# Patient Record
Sex: MALE | Race: WHITE | NOT HISPANIC OR LATINO | Employment: OTHER | URBAN - METROPOLITAN AREA
[De-identification: names, ages, dates, MRNs, and addresses within clinical notes are randomized per-mention and may not be internally consistent; named-entity substitution may affect disease eponyms.]

---

## 2024-02-22 ENCOUNTER — OFFICE VISIT (OUTPATIENT)
Dept: PRIMARY CARE | Facility: CLINIC | Age: 79
End: 2024-02-22
Payer: MEDICARE

## 2024-02-22 ENCOUNTER — LAB (OUTPATIENT)
Dept: LAB | Facility: LAB | Age: 79
End: 2024-02-22
Payer: MEDICARE

## 2024-02-22 VITALS
DIASTOLIC BLOOD PRESSURE: 76 MMHG | BODY MASS INDEX: 26.22 KG/M2 | OXYGEN SATURATION: 94 % | SYSTOLIC BLOOD PRESSURE: 152 MMHG | HEIGHT: 68 IN | WEIGHT: 173 LBS | HEART RATE: 70 BPM

## 2024-02-22 DIAGNOSIS — R03.0 ELEVATED BLOOD PRESSURE READING: Primary | ICD-10-CM

## 2024-02-22 DIAGNOSIS — Z12.5 PROSTATE CANCER SCREENING: ICD-10-CM

## 2024-02-22 DIAGNOSIS — R73.01 IMPAIRED FASTING GLUCOSE: Primary | ICD-10-CM

## 2024-02-22 DIAGNOSIS — E55.9 MILD VITAMIN D DEFICIENCY: ICD-10-CM

## 2024-02-22 DIAGNOSIS — R03.0 ELEVATED BLOOD PRESSURE READING: ICD-10-CM

## 2024-02-22 DIAGNOSIS — Z11.59 ENCOUNTER FOR HEPATITIS C SCREENING TEST FOR LOW RISK PATIENT: ICD-10-CM

## 2024-02-22 DIAGNOSIS — E78.5 DYSLIPIDEMIA: ICD-10-CM

## 2024-02-22 DIAGNOSIS — I10 PRIMARY HYPERTENSION: ICD-10-CM

## 2024-02-22 DIAGNOSIS — Z13.6 ENCOUNTER FOR ABDOMINAL AORTIC ANEURYSM (AAA) SCREENING: ICD-10-CM

## 2024-02-22 DIAGNOSIS — R97.20 ELEVATED PSA: ICD-10-CM

## 2024-02-22 LAB
25(OH)D3 SERPL-MCNC: 19 NG/ML (ref 30–100)
ALBUMIN SERPL BCP-MCNC: 4 G/DL (ref 3.4–5)
ALP SERPL-CCNC: 59 U/L (ref 33–136)
ALT SERPL W P-5'-P-CCNC: 25 U/L (ref 10–52)
ANION GAP SERPL CALC-SCNC: 13 MMOL/L (ref 10–20)
AST SERPL W P-5'-P-CCNC: 17 U/L (ref 9–39)
BILIRUB SERPL-MCNC: 0.5 MG/DL (ref 0–1.2)
BUN SERPL-MCNC: 17 MG/DL (ref 6–23)
CALCIUM SERPL-MCNC: 9.5 MG/DL (ref 8.6–10.6)
CHLORIDE SERPL-SCNC: 102 MMOL/L (ref 98–107)
CHOLEST SERPL-MCNC: 217 MG/DL (ref 0–199)
CHOLESTEROL/HDL RATIO: 3.7
CO2 SERPL-SCNC: 26 MMOL/L (ref 21–32)
CREAT SERPL-MCNC: 0.77 MG/DL (ref 0.5–1.3)
EGFRCR SERPLBLD CKD-EPI 2021: >90 ML/MIN/1.73M*2
ERYTHROCYTE [DISTWIDTH] IN BLOOD BY AUTOMATED COUNT: 13.6 % (ref 11.5–14.5)
EST. AVERAGE GLUCOSE BLD GHB EST-MCNC: 114 MG/DL
GLUCOSE SERPL-MCNC: 105 MG/DL (ref 74–99)
HBA1C MFR BLD: 5.6 %
HCT VFR BLD AUTO: 42.7 % (ref 41–52)
HCV AB SER QL: NONREACTIVE
HDLC SERPL-MCNC: 58.9 MG/DL
HGB BLD-MCNC: 14.6 G/DL (ref 13.5–17.5)
LDLC SERPL CALC-MCNC: 140 MG/DL
MCH RBC QN AUTO: 31.5 PG (ref 26–34)
MCHC RBC AUTO-ENTMCNC: 34.2 G/DL (ref 32–36)
MCV RBC AUTO: 92 FL (ref 80–100)
NON HDL CHOLESTEROL: 158 MG/DL (ref 0–149)
NRBC BLD-RTO: 0 /100 WBCS (ref 0–0)
PLATELET # BLD AUTO: 342 X10*3/UL (ref 150–450)
POTASSIUM SERPL-SCNC: 4.3 MMOL/L (ref 3.5–5.3)
PROT SERPL-MCNC: 6.4 G/DL (ref 6.4–8.2)
RBC # BLD AUTO: 4.64 X10*6/UL (ref 4.5–5.9)
SODIUM SERPL-SCNC: 137 MMOL/L (ref 136–145)
TRIGL SERPL-MCNC: 93 MG/DL (ref 0–149)
TSH SERPL-ACNC: 1.18 MIU/L (ref 0.44–3.98)
VLDL: 19 MG/DL (ref 0–40)
WBC # BLD AUTO: 8.1 X10*3/UL (ref 4.4–11.3)

## 2024-02-22 PROCEDURE — 83036 HEMOGLOBIN GLYCOSYLATED A1C: CPT

## 2024-02-22 PROCEDURE — 85027 COMPLETE CBC AUTOMATED: CPT

## 2024-02-22 PROCEDURE — 80053 COMPREHEN METABOLIC PANEL: CPT

## 2024-02-22 PROCEDURE — 3077F SYST BP >= 140 MM HG: CPT | Performed by: STUDENT IN AN ORGANIZED HEALTH CARE EDUCATION/TRAINING PROGRAM

## 2024-02-22 PROCEDURE — 86803 HEPATITIS C AB TEST: CPT

## 2024-02-22 PROCEDURE — 36415 COLL VENOUS BLD VENIPUNCTURE: CPT

## 2024-02-22 PROCEDURE — 84443 ASSAY THYROID STIM HORMONE: CPT

## 2024-02-22 PROCEDURE — 99215 OFFICE O/P EST HI 40 MIN: CPT | Performed by: STUDENT IN AN ORGANIZED HEALTH CARE EDUCATION/TRAINING PROGRAM

## 2024-02-22 PROCEDURE — 3078F DIAST BP <80 MM HG: CPT | Performed by: STUDENT IN AN ORGANIZED HEALTH CARE EDUCATION/TRAINING PROGRAM

## 2024-02-22 PROCEDURE — 82306 VITAMIN D 25 HYDROXY: CPT

## 2024-02-22 PROCEDURE — 1159F MED LIST DOCD IN RCRD: CPT | Performed by: STUDENT IN AN ORGANIZED HEALTH CARE EDUCATION/TRAINING PROGRAM

## 2024-02-22 PROCEDURE — 1125F AMNT PAIN NOTED PAIN PRSNT: CPT | Performed by: STUDENT IN AN ORGANIZED HEALTH CARE EDUCATION/TRAINING PROGRAM

## 2024-02-22 PROCEDURE — G0103 PSA SCREENING: HCPCS

## 2024-02-22 PROCEDURE — 84154 ASSAY OF PSA FREE: CPT

## 2024-02-22 PROCEDURE — 80061 LIPID PANEL: CPT

## 2024-02-22 RX ORDER — ACETAMINOPHEN 500 MG
1 TABLET ORAL DAILY
Qty: 1 KIT | Refills: 0 | Status: SHIPPED | OUTPATIENT
Start: 2024-02-22

## 2024-02-22 RX ORDER — IBUPROFEN 100 MG/5ML
SUSPENSION, ORAL (FINAL DOSE FORM) ORAL
COMMUNITY

## 2024-02-22 RX ORDER — ALBUTEROL SULFATE 90 UG/1
AEROSOL, METERED RESPIRATORY (INHALATION)
COMMUNITY
Start: 2023-12-04

## 2024-02-22 RX ORDER — LATANOPROST 50 UG/ML
1 SOLUTION/ DROPS OPHTHALMIC NIGHTLY
COMMUNITY
Start: 2024-02-09

## 2024-02-22 RX ORDER — GLUCOSAMINE HCL 500 MG
TABLET ORAL
COMMUNITY
Start: 2021-07-27

## 2024-02-22 NOTE — PATIENT INSTRUCTIONS
Labs in [address] today.     Updated Coronary Calcium Scoring ordered  Updated ultrasound of the aorta to assess for aneurysm  Call [phone] to schedule     Consider updated Colonoscopy   Alternatively, we could do another COLOGUARD     Home Blood Pressure monitoring   Once daily in the morning   Feet flat on the ground; before any morning coffee    Follow up with me in 2-3 months!    Dr. DONG Ferguson

## 2024-02-22 NOTE — PROGRESS NOTES
Silver Villalpando is a 78 y.o. male seen in Clinic at [facility] by Dr. Garrick Addison on 02/22/24 for routine care, as well as for management of the following chronic medical conditions: elevated BP (without diagnosis of HTN), Glaucoma (follows with optho), mild DLD (not on statin). Patient presents today to establish care.     #Elevated BP   - 152/76 manual in office; similar to initial automated   [  ] updated labs today   [  ] home monitoring  [  ] lifestyle discussion--overall very good lifestyle habits  [  ] close follow up in 2-3 months     #DLD  - last lipid panel 06/2022 with , , HDL 57  [  ] updated labs today  - prior CAC scoring in 2017 with result 154  [  ] updated CAC scoring  [  ] discussed at length statin, may consider; would like to wait on updated labs and CAC scoring      #Glaucoma   - follows with optho    Past Medical History: as above   Past Medical History:   Diagnosis Date    Acute maxillary sinusitis, unspecified 12/31/2014    Acute maxillary sinusitis    Acute prostatitis 02/11/2014    Acute bacterial prostatitis    Chronic ethmoidal sinusitis 03/11/2015    Sinusitis chronic, ethmoidal    Cough, unspecified 01/06/2014    Cough    Diverticulosis of large intestine without perforation or abscess without bleeding 01/23/2018    Diverticulosis of large intestine without hemorrhage    Encounter for screening for malignant neoplasm of colon 05/10/2022    Colon cancer screening    Hydrocele, unspecified 01/23/2018    Hydrocele, bilateral    Hyperglycemia, unspecified 05/02/2016    Hyperglycemia    Hyperlipidemia, unspecified 06/01/2022    Hyperlipidemia    Left lower quadrant pain 04/18/2014    Lt groin pain    Low back pain, unspecified 03/18/2015    Low back pain    Other cervical disc degeneration, unspecified cervical region     Degeneration of cervical intervertebral disc    Palpitations 03/18/2015    Palpitations    Personal history of other diseases of the musculoskeletal system and  connective tissue     History of arthritis    Personal history of other diseases of the nervous system and sense organs     History of glaucoma    Pure hypercholesterolemia, unspecified     High cholesterol    Snoring     Snoring    Unspecified glaucoma 2021    Glaucoma of both eyes    Vasomotor rhinitis 2016    Vasomotor rhinitis     Subspecialty Medical Care: orthopedics, urology, ENT, optho    Past Surgical History: Hydrocele   Past Surgical History:   Procedure Laterality Date    KNEE SURGERY  2014    Knee Surgery    OTHER SURGICAL HISTORY  2014    Treatment Of Wrist Fracture     Medications:  Current Outpatient Medications:     albuterol 90 mcg/actuation inhaler, INHALE 2 PUFFS BY MOUTH INTO THE LUNGS EVERY 4 HOURS AS NEEDED FO...  (REFER TO PRESCRIPTION NOTES)., Disp: , Rfl:     cholecalciferol, vitamin D3, 75 mcg (3,000 unit) tablet, Take by mouth., Disp: , Rfl:     latanoprost (Xalatan) 0.005 % ophthalmic solution, Administer 1 drop into both eyes once daily at bedtime., Disp: , Rfl:     ascorbic acid (Vitamin C) 1,000 mg tablet, Take by mouth., Disp: , Rfl:   Pharmacy: [facility] ([place])     Allergies:   - remote Amoxicillin--diarrhea, upset stomach; likely not true allergy   - Rosuvastatin--myalgias   Allergies   Allergen Reactions    Amoxicillin Nausea Only    Rosuvastatin Myalgia    Sulfites Hives    Tamsulosin Other     Immunizations:   - Pneumonia complete  - Tdap due 2024  - COVID vaccinated; recommend staying UTD on boosters  - Flu shot recommended annually   - Shingrix recommended  - RSV recommended     Family History:   - Father with CAD, father  at 75   - Mother with Angina but lived to   - Maternal Aunts all with some form of CAD   - Maternal Grandmother with liver cancer   No family history on file.    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   Activities: golf; no chest pain with exertion   Drug Use: remote smoker, quit in 1990s  Diet: well balanced diet   Depression/Anxiety: discuss at CPE   Sexuality/Contraception/Menstrual History:    Sleep: no concerns     Patient Information:  Health Insurance: Medicare   Transportation: drives   Healthcare POA/Guardian: wife  Contact Information: correct in EMR     Visit Vitals  /76   Pulse 70   Ht 1.727 m (5' 8\")   Wt 78.5 kg (173 lb)   SpO2 94%   BMI 26.30 kg/m²   BSA 1.94 m²      PHYSICAL EXAM:   General: well appearing  male, NAD, pleasant and engaged in encounter    HEENT: NCAT, MMM  CV: RRR, no m/r/g  PULM: CTAB, non-labored respirations   ABD: soft, NT, ND, + bowel sounds   : no suprapubic or CVA tenderness   EXT: WWP, no significant edema   SKIN: no rashes noted   NEURO: A&Ox4, symmetric facies, no gross motor or sensory deficits, normal gait  PSYCH: pleasant mood, appropriate affect     Assessment/Plan    Silver Villalpando is a 78 y.o. male seen in Clinic at [facility] by Dr. Garrick Addison on 02/22/24 for routine care, as well as for management of the following chronic medical conditions: elevated BP (without diagnosis of HTN), Glaucoma (follows with optho), mild DLD (not on statin). Patient presents today to establish care.     #Elevated BP   - 152/76 manual in office; similar to initial automated   [  ] updated labs today   [  ] home monitoring  [  ] lifestyle discussion--overall very good lifestyle habits  [  ] close follow up in 2-3 months     #DLD  - last lipid panel 06/2022 with , , HDL 57  [  ] updated labs today  - prior CAC scoring in 2017 with result 154  [  ] updated CAC scoring  [  ] discussed at length statin, may consider; would like to wait on updated labs and CAC scoring      #Glaucoma   - follows with optho    #Health Maintenance    Cancer Screening  - Colorectal Cancer Screening: considering (colo vs. Updated cologuard)   - Lung Cancer " Screening: not candidate  - Prostate Cancer Screening: labs today     Laboratory Screening  - Lipid Screen: labs today   - ASCVD Score: labs and updated CAC today   - A1C, glucose screen: labs today   - STI, HIV, Hep B screen: defer, low risk   - Hep C screen: negative 2024     Imaging Screening  - AAA screening: prior negative, though some ectasia commented upon; follow up imaging today   - Osteoporosis/DEXA screening: normal 2022     Immunizations:   - Influenza: annually recommended  - COVID: recommend staying UTD  - RSV: recommended   - Tdap: due 12/2024 (give at CPE/MCW visit)   - Prevnar, Pneumovax: series complete   - Shingrix: recommended     Other Screening  - 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     Patient Discussion:    Please call back the office with any questions at [phone]. In the case of an emergency, please call 911 or go to the nearest Emergency Department.      Garrick Addison MD  Internal Medicine-Pediatrics  [facility] [address], [address]  P: [phone], F: [phone]

## 2024-02-24 LAB
PSA FREE MFR SERPL: 15 %
PSA FREE SERPL-MCNC: 1.1 NG/ML
PSA SERPL IA-MCNC: 7.1 NG/ML (ref 0–4)

## 2024-02-28 DIAGNOSIS — M65.322 TRIGGER INDEX FINGER OF LEFT HAND: ICD-10-CM

## 2024-03-13 ENCOUNTER — HOSPITAL ENCOUNTER (OUTPATIENT)
Dept: VASCULAR MEDICINE | Facility: CLINIC | Age: 79
Discharge: HOME | End: 2024-03-13
Payer: MEDICARE

## 2024-03-13 DIAGNOSIS — Z13.6 ENCOUNTER FOR ABDOMINAL AORTIC ANEURYSM (AAA) SCREENING: ICD-10-CM

## 2024-03-13 PROCEDURE — 76706 US ABDL AORTA SCREEN AAA: CPT

## 2024-03-14 ENCOUNTER — HOSPITAL ENCOUNTER (OUTPATIENT)
Dept: RADIOLOGY | Facility: HOSPITAL | Age: 79
Discharge: HOME | End: 2024-03-14
Payer: MEDICARE

## 2024-03-14 DIAGNOSIS — E78.5 DYSLIPIDEMIA: ICD-10-CM

## 2024-03-14 PROCEDURE — 75571 CT HRT W/O DYE W/CA TEST: CPT

## 2024-03-15 ENCOUNTER — HOSPITAL ENCOUNTER (OUTPATIENT)
Facility: CLINIC | Age: 79
Setting detail: OUTPATIENT SURGERY
Discharge: HOME | End: 2024-03-15
Attending: ORTHOPAEDIC SURGERY | Admitting: ORTHOPAEDIC SURGERY
Payer: MEDICARE

## 2024-03-15 VITALS
WEIGHT: 173.06 LBS | SYSTOLIC BLOOD PRESSURE: 156 MMHG | HEART RATE: 68 BPM | TEMPERATURE: 98.6 F | OXYGEN SATURATION: 94 % | RESPIRATION RATE: 16 BRPM | DIASTOLIC BLOOD PRESSURE: 79 MMHG | HEIGHT: 68 IN | BODY MASS INDEX: 26.23 KG/M2

## 2024-03-15 DIAGNOSIS — M65.322 TRIGGER INDEX FINGER OF LEFT HAND: Primary | ICD-10-CM

## 2024-03-15 PROCEDURE — 2500000004 HC RX 250 GENERAL PHARMACY W/ HCPCS (ALT 636 FOR OP/ED): Performed by: ORTHOPAEDIC SURGERY

## 2024-03-15 PROCEDURE — 26055 INCISE FINGER TENDON SHEATH: CPT | Performed by: ORTHOPAEDIC SURGERY

## 2024-03-15 PROCEDURE — 3600000008 HC OR TIME - EACH INCREMENTAL 1 MINUTE - PROCEDURE LEVEL THREE: Performed by: ORTHOPAEDIC SURGERY

## 2024-03-15 PROCEDURE — 2500000005 HC RX 250 GENERAL PHARMACY W/O HCPCS: Performed by: ORTHOPAEDIC SURGERY

## 2024-03-15 PROCEDURE — 3600000003 HC OR TIME - INITIAL BASE CHARGE - PROCEDURE LEVEL THREE: Performed by: ORTHOPAEDIC SURGERY

## 2024-03-15 PROCEDURE — 7100000010 HC PHASE TWO TIME - EACH INCREMENTAL 1 MINUTE: Performed by: ORTHOPAEDIC SURGERY

## 2024-03-15 PROCEDURE — 7100000009 HC PHASE TWO TIME - INITIAL BASE CHARGE: Performed by: ORTHOPAEDIC SURGERY

## 2024-03-15 PROCEDURE — A4217 STERILE WATER/SALINE, 500 ML: HCPCS | Performed by: ORTHOPAEDIC SURGERY

## 2024-03-15 RX ORDER — DIPHENHYDRAMINE HYDROCHLORIDE 50 MG/ML
12.5 INJECTION INTRAMUSCULAR; INTRAVENOUS ONCE AS NEEDED
Status: DISCONTINUED | OUTPATIENT
Start: 2024-03-15 | End: 2024-03-15 | Stop reason: HOSPADM

## 2024-03-15 RX ORDER — FENTANYL CITRATE 50 UG/ML
25 INJECTION, SOLUTION INTRAMUSCULAR; INTRAVENOUS EVERY 5 MIN PRN
Status: DISCONTINUED | OUTPATIENT
Start: 2024-03-15 | End: 2024-03-15 | Stop reason: HOSPADM

## 2024-03-15 RX ORDER — ALBUTEROL SULFATE 0.83 MG/ML
2.5 SOLUTION RESPIRATORY (INHALATION) ONCE AS NEEDED
Status: DISCONTINUED | OUTPATIENT
Start: 2024-03-15 | End: 2024-03-15 | Stop reason: HOSPADM

## 2024-03-15 RX ORDER — SODIUM CHLORIDE, SODIUM LACTATE, POTASSIUM CHLORIDE, CALCIUM CHLORIDE 600; 310; 30; 20 MG/100ML; MG/100ML; MG/100ML; MG/100ML
100 INJECTION, SOLUTION INTRAVENOUS CONTINUOUS
Status: DISCONTINUED | OUTPATIENT
Start: 2024-03-15 | End: 2024-03-15 | Stop reason: HOSPADM

## 2024-03-15 RX ORDER — ONDANSETRON HYDROCHLORIDE 2 MG/ML
4 INJECTION, SOLUTION INTRAVENOUS ONCE AS NEEDED
Status: DISCONTINUED | OUTPATIENT
Start: 2024-03-15 | End: 2024-03-15 | Stop reason: HOSPADM

## 2024-03-15 RX ORDER — LABETALOL HYDROCHLORIDE 5 MG/ML
5 INJECTION, SOLUTION INTRAVENOUS ONCE AS NEEDED
Status: DISCONTINUED | OUTPATIENT
Start: 2024-03-15 | End: 2024-03-15 | Stop reason: HOSPADM

## 2024-03-15 RX ORDER — HYDROCODONE BITARTRATE AND ACETAMINOPHEN 5; 325 MG/1; MG/1
1 TABLET ORAL EVERY 6 HOURS PRN
Qty: 12 TABLET | Refills: 0 | Status: SHIPPED | OUTPATIENT
Start: 2024-03-15 | End: 2024-05-08 | Stop reason: ALTCHOICE

## 2024-03-15 RX ORDER — OXYCODONE HYDROCHLORIDE 10 MG/1
10 TABLET ORAL EVERY 4 HOURS PRN
Status: DISCONTINUED | OUTPATIENT
Start: 2024-03-15 | End: 2024-03-15 | Stop reason: HOSPADM

## 2024-03-15 RX ORDER — LIDOCAINE IN NACL,ISO-OSMOT/PF 30 MG/3 ML
0.1 SYRINGE (ML) INJECTION ONCE
Status: DISCONTINUED | OUTPATIENT
Start: 2024-03-15 | End: 2024-03-15 | Stop reason: HOSPADM

## 2024-03-15 RX ORDER — OXYCODONE HYDROCHLORIDE 5 MG/1
5 TABLET ORAL EVERY 4 HOURS PRN
Status: DISCONTINUED | OUTPATIENT
Start: 2024-03-15 | End: 2024-03-15 | Stop reason: HOSPADM

## 2024-03-15 RX ORDER — HYDRALAZINE HYDROCHLORIDE 20 MG/ML
5 INJECTION INTRAMUSCULAR; INTRAVENOUS EVERY 30 MIN PRN
Status: DISCONTINUED | OUTPATIENT
Start: 2024-03-15 | End: 2024-03-15 | Stop reason: HOSPADM

## 2024-03-15 RX ORDER — DROPERIDOL 2.5 MG/ML
0.62 INJECTION, SOLUTION INTRAMUSCULAR; INTRAVENOUS ONCE AS NEEDED
Status: DISCONTINUED | OUTPATIENT
Start: 2024-03-15 | End: 2024-03-15 | Stop reason: HOSPADM

## 2024-03-15 RX ORDER — SODIUM CHLORIDE 0.9 G/100ML
IRRIGANT IRRIGATION AS NEEDED
Status: DISCONTINUED | OUTPATIENT
Start: 2024-03-15 | End: 2024-03-15 | Stop reason: HOSPADM

## 2024-03-15 RX ORDER — FENTANYL CITRATE 50 UG/ML
50 INJECTION, SOLUTION INTRAMUSCULAR; INTRAVENOUS EVERY 5 MIN PRN
Status: DISCONTINUED | OUTPATIENT
Start: 2024-03-15 | End: 2024-03-15 | Stop reason: HOSPADM

## 2024-03-15 ASSESSMENT — COLUMBIA-SUICIDE SEVERITY RATING SCALE - C-SSRS
6. HAVE YOU EVER DONE ANYTHING, STARTED TO DO ANYTHING, OR PREPARED TO DO ANYTHING TO END YOUR LIFE?: NO
2. HAVE YOU ACTUALLY HAD ANY THOUGHTS OF KILLING YOURSELF?: NO
1. IN THE PAST MONTH, HAVE YOU WISHED YOU WERE DEAD OR WISHED YOU COULD GO TO SLEEP AND NOT WAKE UP?: NO

## 2024-03-15 ASSESSMENT — PAIN - FUNCTIONAL ASSESSMENT
PAIN_FUNCTIONAL_ASSESSMENT: 0-10
PAIN_FUNCTIONAL_ASSESSMENT: 0-10

## 2024-03-15 ASSESSMENT — PAIN SCALES - GENERAL
PAINLEVEL_OUTOF10: 0 - NO PAIN
PAINLEVEL_OUTOF10: 0 - NO PAIN

## 2024-03-15 NOTE — H&P
[facility] Department of Orthopaedic Surgery   Surgical History & Physical >30 Days    Reason for Surgery: left index finger trigger finger  Planned Procedure: left index finger trigger finger release    History & Physical Reviewed:  I have reviewed the History and Physical dated 9/26/23. Relevant findings and updates are noted below:  No significant changes.     Past Medical History:   Diagnosis Date    Acute maxillary sinusitis, unspecified 12/31/2014    Acute maxillary sinusitis    Acute prostatitis 02/11/2014    Acute bacterial prostatitis    Chronic ethmoidal sinusitis 03/11/2015    Sinusitis chronic, ethmoidal    Cough, unspecified 01/06/2014    Cough    Diverticulosis of large intestine without perforation or abscess without bleeding 01/23/2018    Diverticulosis of large intestine without hemorrhage    Encounter for screening for malignant neoplasm of colon 05/10/2022    Colon cancer screening    Hydrocele, unspecified 01/23/2018    Hydrocele, bilateral    Hyperglycemia, unspecified 05/02/2016    Hyperglycemia    Hyperlipidemia, unspecified 06/01/2022    Hyperlipidemia    Left lower quadrant pain 04/18/2014    Lt groin pain    Low back pain, unspecified 03/18/2015    Low back pain    Other cervical disc degeneration, unspecified cervical region     Degeneration of cervical intervertebral disc    Palpitations 03/18/2015    Palpitations    Personal history of other diseases of the musculoskeletal system and connective tissue     History of arthritis    Personal history of other diseases of the nervous system and sense organs     History of glaucoma    Pure hypercholesterolemia, unspecified     High cholesterol    Snoring     Snoring    Unspecified glaucoma 07/26/2021    Glaucoma of both eyes    Vasomotor rhinitis 05/02/2016    Vasomotor rhinitis     Past Surgical History:   Procedure Laterality Date    KNEE SURGERY  12/31/2014    Knee Surgery    OTHER SURGICAL HISTORY  12/31/2014    Treatment Of Wrist  Fracture     Social History     Tobacco Use    Smoking status: Not on file    Smokeless tobacco: Not on file   Substance Use Topics    Alcohol use: Not on file     Prior to Admission medications    Medication Sig Start Date End Date Taking? Authorizing Provider   albuterol 90 mcg/actuation inhaler INHALE 2 PUFFS BY MOUTH INTO THE LUNGS EVERY 4 HOURS AS NEEDED FO...  (REFER TO PRESCRIPTION NOTES). 12/4/23   Historical Provider, MD   ascorbic acid (Vitamin C) 1,000 mg tablet Take by mouth.    Historical Provider, MD   blood pressure monitor (Blood Pressure Kit) kit 1 Units once daily. 2/22/24   Garrick Addison MD   cholecalciferol, vitamin D3, 75 mcg (3,000 unit) tablet Take by mouth. 7/27/21   Historical Provider, MD   latanoprost (Xalatan) 0.005 % ophthalmic solution Administer 1 drop into both eyes once daily at bedtime. 2/9/24   Historical Provider, MD     Allergies   Allergen Reactions    Amoxicillin Nausea Only    Rosuvastatin Myalgia    Sulfites Hives    Tamsulosin Other       Review of Systems:   Gen: Denies recent weight loss  Neuro: Denies recent confusion  Ophtho: Denies changes in vision  ENT: Denies changes in hearing  Endo: Denies weight loss/weight gain  CV: Denies chest pain  Resp: Denies shortness of breath  GI: Denies melena/hematochezia  : Denies painful urination  MSK: Per above HPI  Heme: No abnormal bleeding  Psych: Denies hallucinations      ERAS patient?: No    COVID-19 Risk Consent:   Surgeon has reviewed the key risks related to debbie COVID-19 and subsequent sequelae.       Martin Cooper MD 03/15/24

## 2024-03-15 NOTE — OP NOTE
Left index finger trigger finger release / 25 Minutes (L) Operative Note     Date: 3/15/2024  OR Location: [facility] OR    Name: Silver Villalpando, : 1945, Age: 79 y.o., MRN: [id number], Sex: male    Diagnosis  Pre-op Diagnosis     * Trigger index finger of left hand [M65.322] Post-op Diagnosis     * Trigger index finger of left hand [M65.322]     Procedures  Left index finger trigger finger release / 25 Minutes  22124 - TN TENDON SHEATH INCISION      Surgeons      * Jose Vo - Primary    Resident/Fellow/Other Assistant:  Surgeon(s) and Role:     * Martin Cooper MD - Resident - Assisting    Procedure Summary  Anesthesia: Local  ASA: ASA status not filed in the log.  Anesthesia Staff: No anesthesia staff entered.  Estimated Blood Loss: 0 mL  Intra-op Medications: Administrations occurring from 1030 to 1110 on 03/15/24:  * No intraprocedure medications in log *           Anesthesia Record               Intraprocedure I/O Totals       None           Specimen: No specimens collected     Staff:   Circulator: Erin Buchanan RN  Scrub Person: Mile Butler RN         Drains and/or Catheters: * None in log *    Tourniquet Times:     Total Tourniquet Time Documented:  Arm - Upper (Left) - 6 minutes  Total: Arm - Upper (Left) - 6 minutes      Implants:     Findings: Left index finger trigger finger    Indications: Silver Villalpando is an 79 y.o. male who is having surgery for Trigger index finger of left hand [M65.322].      The patient was seen in the preoperative area. The risks, benefits, complications, treatment options, non-operative alternatives, expected recovery and outcomes were discussed with the patient. The possibilities of reaction to medication, pulmonary aspiration, injury to surrounding structures, bleeding, recurrent infection, the need for additional procedures, failure to diagnose a condition, and creating a complication requiring transfusion or operation were discussed with the patient. The  patient concurred with the proposed plan, giving informed consent.  The site of surgery was properly noted/marked if necessary per policy. The patient has been actively warmed in preoperative area. Preoperative antibiotics are not indicated. Venous thrombosis prophylaxis are not indicated.    Procedure Details:   79-year-old gentleman with symptomatic triggering left index finger presents today for trigger finger release.  Preoperatively left hand was identified and marked for surgery.  Informed consent process was completed.    Patient was brought to the operating room placed supine on the operating table.  Timeout procedure formed to verify correct patient procedure and operative site.  Local anesthetic infiltrated in the palm in line with the index finger.  Left upper extremity was prepped and draped in usual sterile fashion.  Limb was exsanguinated and a tourniquet was inflated to 250 mmHg.    We made an oblique incision over the A1 pulley of the index finger flexor and sheath.  Blunt dissection was carried down to expose the tendon sheath.  Retractors were placed to protect the neurovascular bundles.  The A1 pulley was divided longitudinally.  The patient is able to demonstrate full active finger flexion with no further triggering.  Wound was irrigated and closed after fashion.  A sterile bandage was applied and the tourniquet was deflated.  The patient was transferred to recovery in stable condition.    Postoperatively he will be discharged home once comfortable.  He can remove his bandage on postop day #4 and begin wound care with gentle activities as instructed.  Return to clinic in 2 weeks for wound VAC and advancement of his activities.         Complications:  None; patient tolerated the procedure well.    Disposition: PACU - hemodynamically stable.  Condition: stable         Additional Details:        Attending Attestation: I was present and scrubbed for the entire procedure.    Jose Vo  Phone  Number: [phone]

## 2024-03-15 NOTE — RESULT ENCOUNTER NOTE
HE WOULD RATHER WAIT TO DISCUSS AT FOLLOW UP VISIT AND SAYS HE HAS AN MRI COMING UP HE WOULD WANT TO DISCUSS THAT  WELL.

## 2024-03-21 ENCOUNTER — HOSPITAL ENCOUNTER (OUTPATIENT)
Dept: RADIOLOGY | Facility: HOSPITAL | Age: 79
Discharge: HOME | End: 2024-03-21
Payer: MEDICARE

## 2024-03-21 ENCOUNTER — HOSPITAL ENCOUNTER (OUTPATIENT)
Dept: RADIOLOGY | Facility: HOSPITAL | Age: 79
End: 2024-03-21
Payer: MEDICARE

## 2024-03-21 DIAGNOSIS — R97.20 ELEVATED PSA: ICD-10-CM

## 2024-03-21 PROCEDURE — 72197 MRI PELVIS W/O & W/DYE: CPT

## 2024-03-21 PROCEDURE — 72195 MRI PELVIS W/O DYE: CPT | Performed by: RADIOLOGY

## 2024-03-26 ENCOUNTER — TELEPHONE (OUTPATIENT)
Dept: PRIMARY CARE | Facility: CLINIC | Age: 79
End: 2024-03-26

## 2024-03-28 ENCOUNTER — OFFICE VISIT (OUTPATIENT)
Dept: ORTHOPEDIC SURGERY | Facility: CLINIC | Age: 79
End: 2024-03-28
Payer: MEDICARE

## 2024-03-28 VITALS — WEIGHT: 173 LBS | HEIGHT: 68 IN | BODY MASS INDEX: 26.22 KG/M2

## 2024-03-28 DIAGNOSIS — M65.322 TRIGGER INDEX FINGER OF LEFT HAND: Primary | ICD-10-CM

## 2024-03-28 PROCEDURE — 99024 POSTOP FOLLOW-UP VISIT: CPT | Performed by: ORTHOPAEDIC SURGERY

## 2024-03-28 PROCEDURE — 1036F TOBACCO NON-USER: CPT | Performed by: ORTHOPAEDIC SURGERY

## 2024-03-28 PROCEDURE — 1159F MED LIST DOCD IN RCRD: CPT | Performed by: ORTHOPAEDIC SURGERY

## 2024-03-28 ASSESSMENT — PAIN - FUNCTIONAL ASSESSMENT: PAIN_FUNCTIONAL_ASSESSMENT: 0-10

## 2024-03-28 ASSESSMENT — PAIN SCALES - GENERAL: PAINLEVEL_OUTOF10: 5 - MODERATE PAIN

## 2024-03-28 ASSESSMENT — PAIN DESCRIPTION - DESCRIPTORS: DESCRIPTORS: SORE;TIGHTNESS

## 2024-03-28 NOTE — PROGRESS NOTES
First postoperative visit after left index finger trigger finger release performed on March 15.  Overall doing very well.  Has some subjective stiffness with attempted flexion of the finger.  This is worse in the morning and gets better throughout the day.    Examination reveals healed surgical incision.  Mild distal palmar swelling.  Full finger extension.  He lacks terminal composite active index finger flexion.  Passively I am able to bring him into a full fist but this is subjectively tight.  No evidence of any triggering.  Normal sensation.    Impression: Left index finger trigger finger.    Plan: Patient has requested a referral to therapy.  He did therapy after trigger finger release to his right hand many years ago by another surgeon and thought it was very beneficial.  Referral provided.  Follow-up with me as needed for any further concerns.    Jose Vo MD    [facility] [employer]  Department of Orthopaedic Surgery  Chief of Hand and Upper Extremity Surgery  [facility]    Dictation performed with the use of voice recognition software. Syntax and grammatical errors may exist.

## 2024-04-01 ENCOUNTER — EVALUATION (OUTPATIENT)
Dept: OCCUPATIONAL THERAPY | Facility: HOSPITAL | Age: 79
End: 2024-04-01
Payer: MEDICARE

## 2024-04-01 DIAGNOSIS — M65.322 TRIGGER INDEX FINGER OF LEFT HAND: Primary | ICD-10-CM

## 2024-04-01 PROCEDURE — 97110 THERAPEUTIC EXERCISES: CPT | Mod: GO | Performed by: OCCUPATIONAL THERAPIST

## 2024-04-01 PROCEDURE — 97165 OT EVAL LOW COMPLEX 30 MIN: CPT | Mod: GO | Performed by: OCCUPATIONAL THERAPIST

## 2024-04-01 NOTE — PROGRESS NOTES
Occupational Therapy  Occupational Therapy Orthopedic Evaluation    Patient Name: Silver Villalpando  MRN: [id number]  Today's Date: 4/10/2024  Time Calculation  Start Time: 0735  Stop Time: 0810  Time Calculation (min): 35 min    Insurance:  Visit number: 1   Time:      Insurance Type: Payor: [employer] / Plan: [employer] MEDICARE ADVANTAGE / Product Type: *No Product type* /       General:  Reason for visit: L index finger  Referred by: Dr. Vo    Current Problem  1. Trigger index finger of left hand  Follow Up In Occupational Therapy    Referral to Occupational Therapy          Precautions: per post op protocol       Medical History Form: Reviewed (scanned into chart)    Subjective:   Chief Complaint: L index finger  Onset: ongoing  DOI/DOS: 03/15/2024  L IF trigger release      Hand Dominance: Right    Current Condition since injury:   same     PAIN     Location: 1/10 L IF  Aggravating Factors: functional use  Relieving Factors: rest    Relevant Information (PMH & Previous Tests/Imaging): Reviewed in chart    Prior Level of Function (PLOF)  Exercise/Physical Activity: golfing  Work/School: retired  Current ADL/IADL Status: homemaking     Patients Living Environment: Reviewed and no concern    Primary Language: English    Pt goals for therapy: improve functional use of R hand for golfing, cooking, cleaning, opening jars, squeezing bottles, cleaning, grasping objects, and various household chores    Red Flags: Do you have any of the following? No  Fever/chills, unexplained weight changes, dizziness/fainting, unexplained change in bowel or bladder functions, unexplained malaise or muscle weakness, night pain/sweats, numbness or tingling    Objective:  Distance to DPC  IF: 2 cm  Extension/flexion  MCP: 20-75  PIP: 15-85  DIP: 0-65    Physical Observation: minimally raised scar  Edema: moderate edema    Sensory: altered sensation/circulation  to all tips of digits - ongoing prior to surgery    Outcome Measures:  Quick  DASH: 22.72    EDUCATION: home exercise program, plan of care, activity modifications, pain management, and injury pathology       Goals:  Active       OT Goals       OT Goal 1       Start:  04/01/24    Expected End:  04/22/24       Improve digit flexion to composite fist for hold objects in hand.         OT Goal 2       Start:  04/01/24    Expected End:  04/22/24       Improve PIP and MCP extension to 5 degrees for reaching to grasp objects in hand.         OT Goal 3       Start:  04/01/24    Expected End:  05/13/24       Improve Quick DASH to 15%.         OT Goal 4       Start:  04/01/24    Expected End:  05/13/24       Report compliance with home program for improved fucntional use of L hand.         OT Goal 5       Start:  04/01/24    Expected End:  05/13/24       Improve  strength to 85% of unaffected side for holding golf club and grasping heavier household objects.             Plan of care was developed with input and agreement by the patient    Treatments:     Low complexity evaluation  35 min       Therapeutic Exercise:      HEP education and completion: hook fist, straight fist, composite fist, intrinsic plus, active digit extension, intrinsic stretch, composite flexion stretch     Manual Therapy:        Therapist performed manual scar mobilization and IASTM.       Assessment: Patient is a 80 yo male  s/p L index finger trigger release resulting in limited participation in pain-free ADLs and inability to perform at their prior level of function. Pt would benefit from occupational therapy to address the impairments found & listed previously in the objective section in order to return to safe and pain-free ADLs and prior level of function.       Plan:      Planned Interventions include: therapeutic exercise, therapeutic activity, self-care home management, manual therapy, therapeutic activities, gait training, neuromuscular coordination, vasopneumatic, dry needling, aquatic therapy, electric stimulation,  fluidotherapy, ultrasound, kinesiotaping, orthosis fabrication, wound care  Frequency: 1 x Week  Duration: 6 Weeks      Geoffrey Bundy, OT

## 2024-04-02 ENCOUNTER — OFFICE VISIT (OUTPATIENT)
Dept: UROLOGY | Facility: CLINIC | Age: 79
End: 2024-04-02
Payer: MEDICARE

## 2024-04-02 VITALS — BODY MASS INDEX: 25.76 KG/M2 | HEIGHT: 68 IN | TEMPERATURE: 97.5 F | WEIGHT: 170 LBS

## 2024-04-02 DIAGNOSIS — R97.20 ELEVATED PSA: Primary | ICD-10-CM

## 2024-04-02 LAB
POC APPEARANCE, URINE: CLEAR
POC BILIRUBIN, URINE: NEGATIVE
POC BLOOD, URINE: NEGATIVE
POC COLOR, URINE: YELLOW
POC GLUCOSE, URINE: NEGATIVE MG/DL
POC KETONES, URINE: NEGATIVE MG/DL
POC LEUKOCYTES, URINE: NEGATIVE
POC NITRITE,URINE: NEGATIVE
POC PH, URINE: 6.5 PH
POC PROTEIN, URINE: NEGATIVE MG/DL
POC SPECIFIC GRAVITY, URINE: 1.02
POC UROBILINOGEN, URINE: 0.2 EU/DL

## 2024-04-02 PROCEDURE — 99203 OFFICE O/P NEW LOW 30 MIN: CPT | Performed by: UROLOGY

## 2024-04-02 PROCEDURE — 51798 US URINE CAPACITY MEASURE: CPT | Performed by: UROLOGY

## 2024-04-02 PROCEDURE — 1159F MED LIST DOCD IN RCRD: CPT | Performed by: UROLOGY

## 2024-04-02 PROCEDURE — 1036F TOBACCO NON-USER: CPT | Performed by: UROLOGY

## 2024-04-02 PROCEDURE — 81002 URINALYSIS NONAUTO W/O SCOPE: CPT | Performed by: UROLOGY

## 2024-04-02 RX ORDER — ASPIRIN 81 MG/1
81 TABLET ORAL DAILY
COMMUNITY

## 2024-04-02 NOTE — PROGRESS NOTES
Subjective   Silver Villalpando is a 79 y.o. male presenting today as a new patient, kindly referred by Dr. Addison due to elevated PSA. Patient has history of hydrocelectomy on 3/15/22 with Dr. Busch at [facility]. He reports all bothersome urinary symptoms resolved s/p hydrocelectomy. He has nocturia x2, which is not bothersome. PSA is 7.1 (2/22/24). Prostate MRI from 3/23/2024 showed 68.4g prostate with no sign of cancer.  Patient has no family history of prostate cancer. Denies any recent gross hematuria, fevers, chills, urinary retention, intractable flank or abdominal pain, nausea or vomiting.            Past Medical History:   Diagnosis Date    Acute maxillary sinusitis, unspecified 12/31/2014    Acute maxillary sinusitis    Acute prostatitis 02/11/2014    Acute bacterial prostatitis    Chronic ethmoidal sinusitis 03/11/2015    Sinusitis chronic, ethmoidal    Cough, unspecified 01/06/2014    Cough    Diverticulosis of large intestine without perforation or abscess without bleeding 01/23/2018    Diverticulosis of large intestine without hemorrhage    Encounter for screening for malignant neoplasm of colon 05/10/2022    Colon cancer screening    Hydrocele, unspecified 01/23/2018    Hydrocele, bilateral    Hyperglycemia, unspecified 05/02/2016    Hyperglycemia    Hyperlipidemia, unspecified 06/01/2022    Hyperlipidemia    Left lower quadrant pain 04/18/2014    Lt groin pain    Low back pain, unspecified 03/18/2015    Low back pain    Other cervical disc degeneration, unspecified cervical region     Degeneration of cervical intervertebral disc    Palpitations 03/18/2015    Palpitations    Personal history of other diseases of the musculoskeletal system and connective tissue     History of arthritis    Personal history of other diseases of the nervous system and sense organs     History of glaucoma    Pure hypercholesterolemia, unspecified     High cholesterol    Snoring     Snoring    Unspecified glaucoma  07/26/2021    Glaucoma of both eyes    Vasomotor rhinitis 05/02/2016    Vasomotor rhinitis     Past Surgical History:   Procedure Laterality Date    KNEE SURGERY  12/31/2014    Knee Surgery    OTHER SURGICAL HISTORY  12/31/2014    Treatment Of Wrist Fracture    TRIGGER FINGER RELEASE Right     4th finger     No family history on file.  Current Outpatient Medications   Medication Sig Dispense Refill    albuterol 90 mcg/actuation inhaler INHALE 2 PUFFS BY MOUTH INTO THE LUNGS EVERY 4 HOURS AS NEEDED FO...  (REFER TO PRESCRIPTION NOTES).      ascorbic acid (Vitamin C) 1,000 mg tablet Take by mouth.      aspirin 81 mg EC tablet Take 1 tablet (81 mg) by mouth once daily.      blood pressure monitor (Blood Pressure Kit) kit 1 Units once daily. 1 kit 0    cholecalciferol, vitamin D3, 75 mcg (3,000 unit) tablet Take by mouth.      HYDROcodone-acetaminophen (Norco) 5-325 mg tablet Take 1 tablet by mouth every 6 hours if needed for severe pain (7 - 10). (Patient not taking: Reported on 4/2/2024) 12 tablet 0    latanoprost (Xalatan) 0.005 % ophthalmic solution Administer 1 drop into both eyes once daily at bedtime.       No current facility-administered medications for this visit.     Allergies   Allergen Reactions    Amoxicillin Nausea Only    Rosuvastatin Myalgia    Sulfites Hives    Tamsulosin Other     Social History     Socioeconomic History    Marital status:      Spouse name: Not on file    Number of children: Not on file    Years of education: Not on file    Highest education level: Not on file   Occupational History    Not on file   Tobacco Use    Smoking status: Former     Types: Cigarettes    Smokeless tobacco: Never   Vaping Use    Vaping Use: Never used   Substance and Sexual Activity    Alcohol use: Yes     Comment: 4 drinks bourbon/week    Drug use: Never    Sexual activity: Defer   Other Topics Concern    Not on file   Social History Narrative    Not on file     Social Determinants of Health      Financial Resource Strain: Not on file   Food Insecurity: Not on file   Transportation Needs: Not on file   Physical Activity: Not on file   Stress: Not on file   Social Connections: Not on file   Intimate Partner Violence: Not on file   Housing Stability: Not on file       Review of Systems  Pertinent items are noted in HPI.    Objective     Lab Review  Lab Results   Component Value Date    WBC 8.1 02/22/2024    RBC 4.64 02/22/2024    HGB 14.6 02/22/2024    HCT 42.7 02/22/2024     02/22/2024      Lab Results   Component Value Date    BUN 17 02/22/2024    CREATININE 0.77 02/22/2024      Lab Results   Component Value Date    PSA 7.1 (H) 02/22/2024     PVR 11 mL  IPSS 3 and 0.   Urine analysis shows negative     Assessment/Plan   Diagnoses and all orders for this visit:  Elevated PSA  -     Measure post void residual  -     POCT UA (nonautomated) manually resulted  -     PSA; Future    Elevated PSA     PSA is 7.1 (2/22/24).    I reviewed prostate MRI from 3/23/2024 which showed 68.4g prostate with no sign of cancer.     We will repeat PSA in 1 year and follow up virtually.     All questions were answered to the patient's satisfaction. Patient agrees with the plan and wishes to proceed. Follow-up will be scheduled appropriately.     E&M visit today is associated with current or anticipated ongoing medical care services related to a patient's single, serious condition or a complex condition.    Scribed for Dr. Worthy by Vicki Nava. I , Dr Worthy, have personally reviewed and agreed with the information entered by the Virtual Scribe.

## 2024-04-10 ENCOUNTER — TREATMENT (OUTPATIENT)
Dept: OCCUPATIONAL THERAPY | Facility: HOSPITAL | Age: 79
End: 2024-04-10
Payer: MEDICARE

## 2024-04-10 DIAGNOSIS — M65.322 TRIGGER INDEX FINGER OF LEFT HAND: ICD-10-CM

## 2024-04-10 PROCEDURE — 97140 MANUAL THERAPY 1/> REGIONS: CPT | Mod: GO | Performed by: OCCUPATIONAL THERAPIST

## 2024-04-10 PROCEDURE — 97110 THERAPEUTIC EXERCISES: CPT | Mod: GO | Performed by: OCCUPATIONAL THERAPIST

## 2024-04-10 PROCEDURE — 97035 APP MDLTY 1+ULTRASOUND EA 15: CPT | Mod: GO | Performed by: OCCUPATIONAL THERAPIST

## 2024-04-10 NOTE — PROGRESS NOTES
Occupational Therapy  Occupational Therapy Orthopedic Evaluation    Patient Name: Silver Villalpando  MRN: [id number]  Today's Date: 4/10/2024       Insurance:  Visit number: 2   Time:   Time:  Time Calculation  Start Time: 1330  Stop Time: 1408  Time Calculation (min): 38 min  OT Modalities Time Entry  Ultrasound Time Entry: 8  OT Therapeutic Procedures Time Entry  Manual Therapy Time Entry: 15  Therapeutic Exercise Time Entry: 15       Insurance Type: Payor: [facility] MEDICARE / Plan: [facility] MEDICARE ADVANTAGE / Product Type: *No Product type* /       General:  Reason for visit: L index finger  Referred by: Dr. Vo    Current Problem  1. Trigger index finger of left hand  Follow Up In Occupational Therapy          Precautions: per post op protocol       Medical History Form: Reviewed (scanned into chart)    Subjective:   Chief Complaint: L index finger  Onset: ongoing  DOI/DOS: 03/15/2024  L IF trigger release      Hand Dominance: Right    Current Condition since injury:   same     PAIN     Location: 1/10 L IF  Aggravating Factors: functional use  Relieving Factors: rest    Relevant Information (PMH & Previous Tests/Imaging): Reviewed in chart    Prior Level of Function (PLOF)  Exercise/Physical Activity: golfing  Work/School: retired  Current ADL/IADL Status: homemaking     Patients Living Environment: Reviewed and no concern    Primary Language: English    Pt goals for therapy: improve functional use of R hand for golfing, cooking, cleaning, opening jars, squeezing bottles, cleaning, grasping objects, and various household chores    Red Flags: Do you have any of the following? No  Fever/chills, unexplained weight changes, dizziness/fainting, unexplained change in bowel or bladder functions, unexplained malaise or muscle weakness, night pain/sweats, numbness or tingling    Objective:  Distance to DPC  IF: 1 cm was 2 cm  Extension/flexion  MCP: 20-75  PIP: 5 was 15-98 was 85  DIP: 0-65    Physical Observation:  minimally raised scar  Edema: moderate edema    Sensory: altered sensation/circulation  to all tips of digits - ongoing prior to surgery    Outcome Measures:  Quick DASH: 22.72    EDUCATION: home exercise program, plan of care, activity modifications, pain management, and injury pathology       Goals:  Active       OT Goals       OT Goal 1       Start:  04/01/24    Expected End:  04/22/24       Improve digit flexion to composite fist for hold objects in hand.         OT Goal 2       Start:  04/01/24    Expected End:  04/22/24       Improve PIP and MCP extension to 5 degrees for reaching to grasp objects in hand.         OT Goal 3       Start:  04/01/24    Expected End:  05/13/24       Improve Quick DASH to 15%.         OT Goal 4       Start:  04/01/24    Expected End:  05/13/24       Report compliance with home program for improved fucntional use of L hand.         OT Goal 5       Start:  04/01/24    Expected End:  05/13/24       Improve  strength to 85% of unaffected side for holding golf club and grasping heavier household objects.             Plan of care was developed with input and agreement by the patient    Treatments:      Manual Therapy:     8 min  Ultrasound along volar IP of IF at 3.0 MHz and 1.0 w/cm2.  Therapeutic Exercise:   15 min  HEP education and completion: hook fist, straight fist, composite fist, intrinsic plus, active digit extension, intrinsic stretch, composite flexion stretch   Active extension kick ups.  Digit extension stretch with power web.  Intrinsic slides with dowel maria del rosario.  Active composite fist.      Manual Therapy:     15 min  Therapist performed manual scar mobilization and IASTM.   Therapist performed manual digit flexion and extension stretches.      Assessment:    ROM improved today. Patient doing well overall. Patient to continue ROM and scar mobilization. Patient to follow up next week. Anticipate discharge to home program at that time.  Plan:      Planned Interventions  include: therapeutic exercise, therapeutic activity, self-care home management, manual therapy, therapeutic activities, gait training, neuromuscular coordination, vasopneumatic, dry needling, aquatic therapy, electric stimulation, fluidotherapy, ultrasound, kinesiotaping, orthosis fabrication, wound care  Frequency: 1 x Week  Duration: 6 Weeks      Geoffrey Bundy OT

## 2024-04-15 ENCOUNTER — TREATMENT (OUTPATIENT)
Dept: OCCUPATIONAL THERAPY | Facility: HOSPITAL | Age: 79
End: 2024-04-15
Payer: MEDICARE

## 2024-04-15 ENCOUNTER — APPOINTMENT (OUTPATIENT)
Dept: OCCUPATIONAL THERAPY | Facility: HOSPITAL | Age: 79
End: 2024-04-15
Payer: MEDICARE

## 2024-04-15 DIAGNOSIS — M65.322 TRIGGER INDEX FINGER OF LEFT HAND: Primary | ICD-10-CM

## 2024-04-15 PROCEDURE — 97140 MANUAL THERAPY 1/> REGIONS: CPT | Mod: GO | Performed by: OCCUPATIONAL THERAPIST

## 2024-04-15 PROCEDURE — 97110 THERAPEUTIC EXERCISES: CPT | Mod: GO | Performed by: OCCUPATIONAL THERAPIST

## 2024-04-25 ENCOUNTER — TREATMENT (OUTPATIENT)
Dept: OCCUPATIONAL THERAPY | Facility: HOSPITAL | Age: 79
End: 2024-04-25
Payer: MEDICARE

## 2024-04-25 DIAGNOSIS — M65.322 TRIGGER INDEX FINGER OF LEFT HAND: ICD-10-CM

## 2024-04-25 PROCEDURE — 97110 THERAPEUTIC EXERCISES: CPT | Mod: GO | Performed by: OCCUPATIONAL THERAPIST

## 2024-04-25 PROCEDURE — 97140 MANUAL THERAPY 1/> REGIONS: CPT | Mod: GO | Performed by: OCCUPATIONAL THERAPIST

## 2024-04-25 NOTE — PROGRESS NOTES
Occupational Therapy  Occupational Therapy Orthopedic Treatment    Patient Name: Silver Villalpando  MRN: [id number]  Today's Date: 4/25/2024       Insurance:  Visit number:  4       Insurance Type: Payor: [employer] / Plan: [employer] ADVANTAGE / Product Type: *No Product type* /       General:  Reason for visit: L index finger  Referred by: Dr. Vo    Current Problem  1. Trigger index finger of left hand  Follow Up In Occupational Therapy        Time:  Time Calculation  Start Time: 1030  Stop Time: 1103  Time Calculation (min): 33 min  OT Modalities Time Entry  Ultrasound Time Entry: 8  OT Therapeutic Procedures Time Entry  Manual Therapy Time Entry: 10  Therapeutic Exercise Time Entry: 15    Insurance Type: Payor: [employer]IC[employer] / Plan: [employer] MEDICARE ADVANTAGE / Product Type: *No Product type* /       Precautions: per post op protocol       Medical History Form: Reviewed (scanned into chart)    Subjective: I feel like my finger loosened up over the weekend. I am actually able to open things now.   Chief Complaint: L index finger  Onset: ongoing  DOI/DOS: 03/15/2024  L IF trigger release      Hand Dominance: Right    Current Condition since injury:   same     PAIN     Location: 1/10 L IF  Aggravating Factors: functional use  Relieving Factors: rest    Relevant Information (PMH & Previous Tests/Imaging): Reviewed in chart    Prior Level of Function (PLOF)  Exercise/Physical Activity: golfing  Work/School: retired  Current ADL/IADL Status: homemaking     Patients Living Environment: Reviewed and no concern    Primary Language: English    Pt goals for therapy: improve functional use of R hand for golfing, cooking, cleaning, opening jars, squeezing bottles, cleaning, grasping objects, and various household chores    Red Flags: Do you have any of the following? No  Fever/chills, unexplained weight changes, dizziness/fainting, unexplained change in bowel or bladder functions, unexplained malaise or muscle  weakness, night pain/sweats, numbness or tingling    Objective:  Distance to DPC  IF: 0 cm   Extension/flexion  MCP: 10  -80    PIP: 5 -103 was 98  DIP: 0-75 was 70     strength: 65#/50#   Physical Observation: minimally raised scar  Edema: moderate edema    Sensory: altered sensation/circulation  to all tips of digits - ongoing prior to surgery    Outcome Measures:  Quick DASH: 22.72    EDUCATION: home exercise program, plan of care, activity modifications, pain management, and injury pathology       Goals:  Active       OT Goals       OT Goal 1       Start:  04/01/24    Expected End:  04/22/24       Improve digit flexion to composite fist for hold objects in hand.         OT Goal 2       Start:  04/01/24    Expected End:  04/22/24       Improve PIP and MCP extension to 5 degrees for reaching to grasp objects in hand.         OT Goal 3       Start:  04/01/24    Expected End:  05/13/24       Improve Quick DASH to 15%.         OT Goal 4       Start:  04/01/24    Expected End:  05/13/24       Report compliance with home program for improved fucntional use of L hand.         OT Goal 5       Start:  04/01/24    Expected End:  05/13/24       Improve  strength to 85% of unaffected side for holding golf club and grasping heavier household objects.             Plan of care was developed with input and agreement by the patient    Treatments:      Manual Therapy:     8 min  Ultrasound along volar IP of IF at 3.0 MHz and 1.0 w/cm2.  Therapeutic Exercise:   15 min  HEP education and completion: hook fist, straight fist, composite fist, intrinsic plus, active digit extension, intrinsic stretch, composite flexion stretch   Active extension kick ups.  Digit extension stretch with power web.  Intrinsic slides with dowel maria del rosario.  Active composite fist.  Sponge pickup with hand gripper on light resistance.         Manual Therapy:     10 min  Therapist performed manual scar mobilization and IASTM.   Therapist performed manual  digit flexion and extension stretches.      Assessment:      Continued ROM and scar mobilization. Patient doing well overall. Plan to discharge patient to home program after next visit.   Plan:      Planned Interventions include: therapeutic exercise, therapeutic activity, self-care home management, manual therapy, therapeutic activities, gait training, neuromuscular coordination, vasopneumatic, dry needling, aquatic therapy, electric stimulation, fluidotherapy, ultrasound, kinesiotaping, orthosis fabrication, wound care  Frequency: 1 x Week  Duration: 6 Weeks      Geoffrey Bundy, OT

## 2024-05-06 ENCOUNTER — TREATMENT (OUTPATIENT)
Dept: OCCUPATIONAL THERAPY | Facility: HOSPITAL | Age: 79
End: 2024-05-06
Payer: MEDICARE

## 2024-05-06 DIAGNOSIS — M65.322 TRIGGER INDEX FINGER OF LEFT HAND: Primary | ICD-10-CM

## 2024-05-06 PROCEDURE — 97110 THERAPEUTIC EXERCISES: CPT | Mod: GO | Performed by: OCCUPATIONAL THERAPIST

## 2024-05-06 PROCEDURE — 97140 MANUAL THERAPY 1/> REGIONS: CPT | Mod: GO | Performed by: OCCUPATIONAL THERAPIST

## 2024-05-06 NOTE — PROGRESS NOTES
Occupational Therapy  Occupational Therapy Orthopedic Discharge    Patient Name: Silver Villalpando  MRN: [id number]  Today's Date: 5/6/2024  Time Calculation  Start Time: 1030  Stop Time: 1058  Time Calculation (min): 28 min    Insurance:  Visit number:  5       Time:  Time Calculation  Start Time: 1030  Stop Time: 1058  Time Calculation (min): 28 min  OT Modalities Time Entry  Ultrasound Time Entry: 8  OT Therapeutic Procedures Time Entry  Manual Therapy Time Entry: 10  Therapeutic Exercise Time Entry: 10    Insurance Type: Payor: [employer] / Plan: [employer] MEDICARE ADVANTAGE / Product Type: *No Product type* /       General:  Reason for visit: L index finger  Referred by: Dr. Vo    Current Problem  1. Trigger index finger of left hand                Precautions: per post op protocol       Medical History Form: Reviewed (scanned into chart)    Subjective: I was able to golf twice. It is doing very well overall.   Chief Complaint: L index finger  Onset: ongoing  DOI/DOS: 03/15/2024  L IF trigger release      Hand Dominance: Right    Current Condition since injury:   same     PAIN     Location: 0/10 L IF  Aggravating Factors: functional use  Relieving Factors: rest    Relevant Information (PMH & Previous Tests/Imaging): Reviewed in chart    Prior Level of Function (PLOF)  Exercise/Physical Activity: golfing  Work/School: retired  Current ADL/IADL Status: homemaking     Patients Living Environment: Reviewed and no concern    Primary Language: English    Pt goals for therapy: improve functional use of R hand for golfing, cooking, cleaning, opening jars, squeezing bottles, cleaning, grasping objects, and various household chores    Red Flags: Do you have any of the following? No  Fever/chills, unexplained weight changes, dizziness/fainting, unexplained change in bowel or bladder functions, unexplained malaise or muscle weakness, night pain/sweats, numbness or tingling    Objective:  Distance to DPC  IF: 0 cm    Extension/flexion  MCP: 0  -80    PIP: 5 -103    DIP: 0-75     strength: 65#/58# was 50#   Physical Observation: minimally raised scar  Edema: moderate edema    Sensory: altered sensation/circulation  to all tips of digits - ongoing prior to surgery    Outcome Measures:  Quick DASH: 11.36 was 22.72    EDUCATION: home exercise program, plan of care, activity modifications, pain management, and injury pathology       Goals:  Resolved       OT Goals       OT Goal 1 (Met)       Start:  04/01/24    Expected End:  04/22/24    Resolved:  05/06/24    Improve digit flexion to composite fist for hold objects in hand.         OT Goal 2 (Met)       Start:  04/01/24    Expected End:  04/22/24    Resolved:  05/06/24    Improve PIP and MCP extension to 5 degrees for reaching to grasp objects in hand.         OT Goal 3 (Met)       Start:  04/01/24    Expected End:  05/13/24    Resolved:  05/06/24    Improve Quick DASH to 15%.         OT Goal 4 (Met)       Start:  04/01/24    Expected End:  05/13/24    Resolved:  05/06/24    Report compliance with home program for improved fucntional use of L hand.         OT Goal 5 (Met)       Start:  04/01/24    Expected End:  05/13/24    Resolved:  05/06/24    Improve  strength to 85% of unaffected side for holding golf club and grasping heavier household objects.             Plan of care was developed with input and agreement by the patient    Treatments:      Manual Therapy:     8 min  Ultrasound along volar IP of IF at 3.0 MHz and 1.0 w/cm2.  Therapeutic Exercise:   10 min  HEP education and completion: hook fist, straight fist, composite fist, intrinsic plus, active digit extension, intrinsic stretch, composite flexion stretch   Digit extension stretch with power web.  Sponge pickup with hand gripper on max resistance.         Manual Therapy:     10 min  Therapist performed manual scar mobilization and IASTM.   Therapist performed manual digit flexion and extension  stretches.      Assessment:    Patient doing very well overall. Patient discharged to home program. Patient to follow up prn with any questions or concerns. All goals were met.  Plan:      Discharged to home program.      Geoffrey Bundy OT

## 2024-05-08 ENCOUNTER — OFFICE VISIT (OUTPATIENT)
Dept: PRIMARY CARE | Facility: CLINIC | Age: 79
End: 2024-05-08
Payer: MEDICARE

## 2024-05-08 VITALS
SYSTOLIC BLOOD PRESSURE: 124 MMHG | WEIGHT: 176 LBS | HEART RATE: 67 BPM | DIASTOLIC BLOOD PRESSURE: 70 MMHG | BODY MASS INDEX: 26.76 KG/M2 | OXYGEN SATURATION: 93 %

## 2024-05-08 DIAGNOSIS — Z00.00 MEDICARE ANNUAL WELLNESS VISIT, SUBSEQUENT: Primary | ICD-10-CM

## 2024-05-08 DIAGNOSIS — R97.20 ELEVATED PSA: ICD-10-CM

## 2024-05-08 DIAGNOSIS — E78.5 DYSLIPIDEMIA: ICD-10-CM

## 2024-05-08 DIAGNOSIS — R03.0 ELEVATED BLOOD PRESSURE READING: ICD-10-CM

## 2024-05-08 DIAGNOSIS — Z23 IMMUNIZATION DUE: ICD-10-CM

## 2024-05-08 PROCEDURE — 99397 PER PM REEVAL EST PAT 65+ YR: CPT | Performed by: STUDENT IN AN ORGANIZED HEALTH CARE EDUCATION/TRAINING PROGRAM

## 2024-05-08 PROCEDURE — 90715 TDAP VACCINE 7 YRS/> IM: CPT | Performed by: STUDENT IN AN ORGANIZED HEALTH CARE EDUCATION/TRAINING PROGRAM

## 2024-05-08 PROCEDURE — 1159F MED LIST DOCD IN RCRD: CPT | Performed by: STUDENT IN AN ORGANIZED HEALTH CARE EDUCATION/TRAINING PROGRAM

## 2024-05-08 PROCEDURE — 1123F ACP DISCUSS/DSCN MKR DOCD: CPT | Performed by: STUDENT IN AN ORGANIZED HEALTH CARE EDUCATION/TRAINING PROGRAM

## 2024-05-08 PROCEDURE — G0439 PPPS, SUBSEQ VISIT: HCPCS | Performed by: STUDENT IN AN ORGANIZED HEALTH CARE EDUCATION/TRAINING PROGRAM

## 2024-05-08 PROCEDURE — 99214 OFFICE O/P EST MOD 30 MIN: CPT | Performed by: STUDENT IN AN ORGANIZED HEALTH CARE EDUCATION/TRAINING PROGRAM

## 2024-05-08 PROCEDURE — 1170F FXNL STATUS ASSESSED: CPT | Performed by: STUDENT IN AN ORGANIZED HEALTH CARE EDUCATION/TRAINING PROGRAM

## 2024-05-08 PROCEDURE — 90471 IMMUNIZATION ADMIN: CPT | Performed by: STUDENT IN AN ORGANIZED HEALTH CARE EDUCATION/TRAINING PROGRAM

## 2024-05-08 RX ORDER — ROSUVASTATIN CALCIUM 10 MG/1
10 TABLET, COATED ORAL DAILY
Qty: 30 TABLET | Refills: 0 | Status: SHIPPED | OUTPATIENT
Start: 2024-05-08 | End: 2024-06-02

## 2024-05-08 NOTE — PROGRESS NOTES
Silver Villalpando is a 79 y.o. male seen in Clinic at [facility] by [place]. Garrick Addison on 24 for routine care, as well as for management of the following chronic medical conditions: elevated BP (without diagnosis of HTN), Glaucoma (follows with optho), mild DLD (not on statin). Patient presents today for CPE/MCW visit.     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 in non-diabetic patient with almost daily alcohol use   [  ] defers further workup or any prescriptions at this time  [  ] supplement recommendations per patient request, notable B complex and glucosamine  [  ] continue to monitor, if not improving, may be agreeable to EMG study     CHRONIC MEDICAL CONDITIONS:   #Elevated BP   - 124/70 in office today; improved from prior   - reassuring labs 2024   - lifestyle discussion--overall very good lifestyle habits    #DLD  - last lipid panel 2022 with , , HDL 57; increased per labs 2024   - prior CAC scoring in 2017 with result 154  [x] updated CAC scorin.3 per study 2024   - does take ASA for primary prevention     [  ] agreeable to trial of Rosuva 10mg: prescribed today     #Elevated PSA   - reassuring prostate MRI  - seen by urology, Dr. Worthy  - repeat PSA in  with follow up at that time     #Glaucoma   - follows with optho    Past Medical History: as above   Past Medical History:   Diagnosis Date    Acute maxillary sinusitis, unspecified 2014    Acute maxillary sinusitis    Acute prostatitis 2014    Acute bacterial prostatitis    Chronic ethmoidal sinusitis 2015    Sinusitis chronic, ethmoidal    Cough, unspecified 2014    Cough    Diverticulosis of large intestine without perforation or abscess without bleeding 2018    Diverticulosis of large intestine without hemorrhage    Encounter for screening for malignant neoplasm of colon 05/10/2022    Colon cancer screening    Hydrocele, unspecified  01/23/2018    Hydrocele, bilateral    Hyperglycemia, unspecified 05/02/2016    Hyperglycemia    Hyperlipidemia, unspecified 06/01/2022    Hyperlipidemia    Left lower quadrant pain 04/18/2014    Lt groin pain    Low back pain, unspecified 03/18/2015    Low back pain    Other cervical disc degeneration, unspecified cervical region     Degeneration of cervical intervertebral disc    Palpitations 03/18/2015    Palpitations    Personal history of other diseases of the musculoskeletal system and connective tissue     History of arthritis    Personal history of other diseases of the nervous system and sense organs     History of glaucoma    Pure hypercholesterolemia, unspecified     High cholesterol    Snoring     Snoring    Unspecified glaucoma 07/26/2021    Glaucoma of both eyes    Vasomotor rhinitis 05/02/2016    Vasomotor rhinitis     Subspecialty Medical Care: orthopedics, urology, ENT, optho    Past Surgical History: Hydrocele   Past Surgical History:   Procedure Laterality Date    KNEE SURGERY  12/31/2014    Knee Surgery    OTHER SURGICAL HISTORY  12/31/2014    Treatment Of Wrist Fracture    TRIGGER FINGER RELEASE Right     4th finger     Medications:  Current Outpatient Medications:     albuterol 90 mcg/actuation inhaler, INHALE 2 PUFFS BY MOUTH INTO THE LUNGS EVERY 4 HOURS AS NEEDED FO...  (REFER TO PRESCRIPTION NOTES)., Disp: , Rfl:     ascorbic acid (Vitamin C) 1,000 mg tablet, Take by mouth., Disp: , Rfl:     aspirin 81 mg EC tablet, Take 1 tablet (81 mg) by mouth once daily., Disp: , Rfl:     blood pressure monitor (Blood Pressure Kit) kit, 1 Units once daily., Disp: 1 kit, Rfl: 0    cholecalciferol, vitamin D3, 75 mcg (3,000 unit) tablet, Take by mouth., Disp: , Rfl:     latanoprost (Xalatan) 0.005 % ophthalmic solution, Administer 1 drop into both eyes once daily at bedtime., Disp: , Rfl:     rosuvastatin (Crestor) 10 mg tablet, Take 1 tablet (10 mg) by mouth once daily., Disp: 30 tablet, Rfl: 0  Pharmacy:  [facility] ([place])     Allergies:   - remote Amoxicillin--diarrhea, upset stomach; likely not true allergy   - Rosuvastatin--myalgias   Allergies   Allergen Reactions    Amoxicillin Nausea Only    Rosuvastatin Myalgia    Sulfites Hives    Tamsulosin Other     Immunizations:   - Pneumonia complete  - Tdap due 2024; given today   - COVID vaccinated; recommend staying UTD on boosters  - Flu shot recommended annually   - Shingrix recommended  - RSV recommended     Family History:   - Father with CAD, father  at 75   - Mother with Angina but lived to   - Maternal Aunts all with some form of CAD   - Maternal Grandmother with liver cancer   No family history on file.    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   Activities: golf; no chest pain with exertion   Drug Use: remote smoker, quit in , 3-4oz bourbon 5x/week    Diet: well balanced diet   Depression/Anxiety: negative   Sexuality/Contraception/Menstrual History:    Sleep: no concerns     Patient Information:  Health Insurance: Medicare   Transportation: "[employer] POA/Guardian: wife  Contact Information: correct in EMR     Visit Vitals  /70   Pulse 67   Wt 79.8 kg (176 lb)   SpO2 93%   BMI 26.76 kg/m²   Smoking Status Former   BSA 1.96 m²      PHYSICAL EXAM:   General: well appearing  male, NAD, pleasant and engaged in encounter    HEENT: NCAT, MMM  CV: RRR, no m/r/g  PULM: CTAB, non-labored respirations   ABD: soft, NT, ND, + bowel sounds   : no suprapubic or CVA tenderness   EXT: WWP, no significant edema   SKIN: no rashes noted   NEURO: A&Ox4, symmetric facies, no gross motor or sensory deficits, normal gait  PSYCH: pleasant mood, appropriate affect     Assessment/Plan    Silver Villalpando is a 79 y.o. male seen in Clinic at [facility] [place] by [place]. Garrick Addison on 24 for routine care, as well as  for management of the following chronic medical conditions: elevated BP (without diagnosis of HTN), Glaucoma (follows with optho), mild DLD (not on statin). Patient presents today for CPE/MCW visit.     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 in non-diabetic patient with almost daily alcohol use   [  ] defers further workup or any prescriptions at this time  [  ] supplement recommendations per patient request, notable B complex and glucosamine  [  ] continue to monitor, if not improving, may be agreeable to EMG study     CHRONIC MEDICAL CONDITIONS:   #Elevated BP   - 124/70 in office today; improved from prior   - reassuring labs 2024   - lifestyle discussion--overall very good lifestyle habits    #DLD  - last lipid panel 2022 with , , HDL 57; increased per labs 2024   - prior CAC scoring in 2017 with result 154  [x] updated CAC scorin.3 per study 2024   - does take ASA for primary prevention     [  ] agreeable to trial of Rosuva 10mg: prescribed today     #Elevated PSA   - reassuring prostate MRI  - seen by urology, Dr. Worthy  - repeat PSA in  with follow up at that time     #Glaucoma   - follows with optho    #Health Maintenance    Cancer Screening  - Colorectal Cancer Screening: considering but defers for now, may consider at the end of summer doing Colonoscopy   - Lung Cancer Screening: not candidate  - Prostate Cancer Screening: as above      Laboratory Screening  - Lipid Screen: new start STATIN today   - ASCVD Score: elevated CAC scoring   - A1C, glucose screen: 5.6% in 2024  - STI, HIV, Hep B screen: defer, low risk   - Hep C screen: negative      Imaging Screening  - AAA screening: negative    - Osteoporosis/DEXA screening: normal      Immunizations:   - Influenza: annually recommended  - COVID: recommend staying UTD  - RSV: recommended   - Tdap: due 2024 (give at CPE/MCW visit): GIVEN TODAY   -  Prevnar, Pneumovax: series complete   - Shingrix: recommended     Other Screening  - 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     Patient Discussion:    Please call back the office with any questions at [phone]. In the case of an emergency, please call 911 or go to the nearest Emergency Department.      Garrick Addison MD  Internal Medicine-Pediatrics  [facility] [place] [address], [address]  P: [phone], F: [phone]

## 2024-05-08 NOTE — PATIENT INSTRUCTIONS
Tetanus shot today    Try Glucosamine/Chondroitin supplement for joint pain    https://www.[facility].nih.gov/health/glucosamine-and-chondroitin-for-osteoarthritis-what-you-need-to-know    Vitamin B12 supplement or B-complex supplement for neuropathic pain  If not improving, let me know and we may pursue EMG study   Can also consider referral to [facility] at     Limit alcohol and be mindful of sugar intake     Try ROSUVASTATIN 10mg once daily; let me know how it goes!  If you stay on it, repeat labs in mid-July to see how the medication is working in controlling your cholesterol--labs are ordered and can be done at any  lab, just make sure they are FASTING!    Best,   Dr. UMANA

## 2024-05-09 ASSESSMENT — PATIENT HEALTH QUESTIONNAIRE - PHQ9
1. LITTLE INTEREST OR PLEASURE IN DOING THINGS: NOT AT ALL
2. FEELING DOWN, DEPRESSED OR HOPELESS: NOT AT ALL
SUM OF ALL RESPONSES TO PHQ9 QUESTIONS 1 AND 2: 0

## 2024-05-09 ASSESSMENT — ACTIVITIES OF DAILY LIVING (ADL)
TAKING_MEDICATION: INDEPENDENT
BATHING: INDEPENDENT
DRESSING: INDEPENDENT
MANAGING_FINANCES: INDEPENDENT
DOING_HOUSEWORK: INDEPENDENT
GROCERY_SHOPPING: INDEPENDENT

## 2024-05-09 ASSESSMENT — ENCOUNTER SYMPTOMS
LOSS OF SENSATION IN FEET: 1
DEPRESSION: 0
OCCASIONAL FEELINGS OF UNSTEADINESS: 0

## 2024-06-01 DIAGNOSIS — E78.5 DYSLIPIDEMIA: ICD-10-CM

## 2024-06-02 RX ORDER — ROSUVASTATIN CALCIUM 10 MG/1
10 TABLET, COATED ORAL DAILY
Qty: 90 TABLET | Refills: 1 | Status: SHIPPED | OUTPATIENT
Start: 2024-06-02

## 2024-10-16 ENCOUNTER — OFFICE VISIT (OUTPATIENT)
Dept: OTOLARYNGOLOGY | Facility: CLINIC | Age: 79
End: 2024-10-16
Payer: MEDICARE

## 2024-10-16 VITALS
TEMPERATURE: 97.8 F | HEART RATE: 68 BPM | WEIGHT: 184 LBS | SYSTOLIC BLOOD PRESSURE: 144 MMHG | OXYGEN SATURATION: 95 % | BODY MASS INDEX: 27.89 KG/M2 | DIASTOLIC BLOOD PRESSURE: 84 MMHG | HEIGHT: 68 IN

## 2024-10-16 DIAGNOSIS — L29.9 ITCHING OF EAR: ICD-10-CM

## 2024-10-16 DIAGNOSIS — H61.23 BILATERAL IMPACTED CERUMEN: Primary | ICD-10-CM

## 2024-10-16 PROCEDURE — 1036F TOBACCO NON-USER: CPT | Performed by: NURSE PRACTITIONER

## 2024-10-16 PROCEDURE — 99212 OFFICE O/P EST SF 10 MIN: CPT | Performed by: NURSE PRACTITIONER

## 2024-10-16 PROCEDURE — 69210 REMOVE IMPACTED EAR WAX UNI: CPT | Performed by: NURSE PRACTITIONER

## 2024-10-16 PROCEDURE — 69210 REMOVE IMPACTED EAR WAX UNI: CPT | Mod: 50 | Performed by: NURSE PRACTITIONER

## 2024-10-16 PROCEDURE — 1160F RVW MEDS BY RX/DR IN RCRD: CPT | Performed by: NURSE PRACTITIONER

## 2024-10-16 PROCEDURE — 1126F AMNT PAIN NOTED NONE PRSNT: CPT | Performed by: NURSE PRACTITIONER

## 2024-10-16 PROCEDURE — 1123F ACP DISCUSS/DSCN MKR DOCD: CPT | Performed by: NURSE PRACTITIONER

## 2024-10-16 PROCEDURE — 1159F MED LIST DOCD IN RCRD: CPT | Performed by: NURSE PRACTITIONER

## 2024-10-16 SDOH — ECONOMIC STABILITY: FOOD INSECURITY: WITHIN THE PAST 12 MONTHS, THE FOOD YOU BOUGHT JUST DIDN'T LAST AND YOU DIDN'T HAVE MONEY TO GET MORE.: NEVER TRUE

## 2024-10-16 SDOH — ECONOMIC STABILITY: FOOD INSECURITY: WITHIN THE PAST 12 MONTHS, YOU WORRIED THAT YOUR FOOD WOULD RUN OUT BEFORE YOU GOT MONEY TO BUY MORE.: NEVER TRUE

## 2024-10-16 ASSESSMENT — ENCOUNTER SYMPTOMS
LOSS OF SENSATION IN FEET: 0
OCCASIONAL FEELINGS OF UNSTEADINESS: 0
DEPRESSION: 0

## 2024-10-16 ASSESSMENT — PATIENT HEALTH QUESTIONNAIRE - PHQ9
2. FEELING DOWN, DEPRESSED OR HOPELESS: NOT AT ALL
SUM OF ALL RESPONSES TO PHQ9 QUESTIONS 1 AND 2: 0
1. LITTLE INTEREST OR PLEASURE IN DOING THINGS: NOT AT ALL

## 2024-10-16 ASSESSMENT — LIFESTYLE VARIABLES
HOW MANY STANDARD DRINKS CONTAINING ALCOHOL DO YOU HAVE ON A TYPICAL DAY: 1 OR 2
HOW OFTEN DO YOU HAVE A DRINK CONTAINING ALCOHOL: 2-4 TIMES A MONTH
AUDIT-C TOTAL SCORE: 2
SKIP TO QUESTIONS 9-10: 1
HOW OFTEN DO YOU HAVE SIX OR MORE DRINKS ON ONE OCCASION: NEVER

## 2024-10-16 ASSESSMENT — PAIN SCALES - GENERAL: PAINLEVEL_OUTOF10: 0-NO PAIN

## 2024-10-16 NOTE — PROGRESS NOTES
Subjective   Patient ID: Silver Villalpando is a 79 y.o. male who presents for ear cleaning.  HPI  Patient is here today for ear cleaning. No ear infection in the interim. Hearing is stable. He has both ear itching.   Review of Systems      All other systems have been reviewed and are negative for complaints except for those mentioned in history of present illness, past medical history and problem list       Objective   Physical Exam    CONSTITUTIONAL: No acute distress, normal facial features; No fever; no chills  VOICE: No hoarseness or other audible abnormality  RESPIRATION: Breathing comfortably, no stridor; normal breathing effort  CV: No cyanosis visible on the face and neck area  EYES:Pupils equal and round ; no erythema; conjunctiva clear; sclera white  NEURO: Alert and oriented, able to raise eyebrows symmetrical bilateral, smile with no facial droop, able to swallow  HEAD AND FACE: Symmetric facial features, no masses or lesions    Right ear examination: External ear normal. EAC with impacted cerumen. TM not visualized.   Left ear examination: External ear normal. EAC with impacted cerumen. TM not visualized.     NOSE: External nose midline  ORAL CAVITY: No lesions of external lips; uvula is midline; tongue with good mobility; no gross mass in oral cavity; mucosa appears pink   NECK/LYMPH: No obvious deformity or lesions; trachea is midline  PSYCH: Alert and oriented with appropriate mood and affect.    Patient ID: Silver Villalpando is a 79 y.o. male.    Procedures    Cerumen removal    Consent:  The planned procedure is discussed including possible risk, benefits and alternative treatments reviewed.  Verbal consent is obtained.    Indications:Obstructed cerumen is noted affecting hearing and causing discomfort.    Procedure: The ears are examined microscopically.  Using speculum, alligator, #7, #5 suction the obstructive cerumen in both the ears were removed.    Findings: Cerumen and epithelial debris  obstruction in both external auditory canals.  Inspection of tympanic membrane after cleaning showed intact with no effusion, retraction or perforation.    Post procedure: The patient tolerated the procedure well without complications       Assessment/Plan       1. Bilateral impacted cerumen        2. Itching of ear          This patient presents for evaluation of cerumen impaction. The ear/s cleaned using microscope and instruments.  Patient tolerated the procedure well. Inspection of TM after cleaning showed intact and WNL.  Patient was provided instructions on ear care for cerumen in the discussion summary. Patient may follow up as needed for repeat cleaning or for all other ENT concerns.  All questions were answered to patient's satisfaction.            DANIEL Carmona 10/16/24 11:02 AM

## 2024-12-03 ENCOUNTER — TELEPHONE (OUTPATIENT)
Dept: PRIMARY CARE | Facility: CLINIC | Age: 79
End: 2024-12-03

## 2025-02-03 ENCOUNTER — APPOINTMENT (OUTPATIENT)
Dept: OPHTHALMOLOGY | Facility: CLINIC | Age: 80
End: 2025-02-03
Payer: MEDICARE

## 2025-02-03 DIAGNOSIS — H25.813 COMBINED FORMS OF AGE-RELATED CATARACT OF BOTH EYES: ICD-10-CM

## 2025-02-03 DIAGNOSIS — H40.1131 PRIMARY OPEN ANGLE GLAUCOMA OF BOTH EYES, MILD STAGE: Primary | ICD-10-CM

## 2025-02-03 PROCEDURE — 99204 OFFICE O/P NEW MOD 45 MIN: CPT | Performed by: OPHTHALMOLOGY

## 2025-02-03 PROCEDURE — 92133 CPTRZD OPH DX IMG PST SGM ON: CPT | Performed by: OPHTHALMOLOGY

## 2025-02-03 PROCEDURE — 92083 EXTENDED VISUAL FIELD XM: CPT | Performed by: OPHTHALMOLOGY

## 2025-02-03 RX ORDER — MV-MIN/FA/VIT K/LUTEIN/ZEAXANT 200MCG-5MG
CAPSULE ORAL
COMMUNITY

## 2025-02-03 RX ORDER — TIMOLOL MALEATE 2.5 MG/ML
1 SOLUTION/ DROPS OPHTHALMIC DAILY
COMMUNITY
Start: 2025-01-20

## 2025-02-03 ASSESSMENT — ENCOUNTER SYMPTOMS
CONSTITUTIONAL NEGATIVE: 0
GASTROINTESTINAL NEGATIVE: 0
EYES NEGATIVE: 1
ENDOCRINE NEGATIVE: 0
HEMATOLOGIC/LYMPHATIC NEGATIVE: 0
ALLERGIC/IMMUNOLOGIC NEGATIVE: 0
PSYCHIATRIC NEGATIVE: 0
MUSCULOSKELETAL NEGATIVE: 0
NEUROLOGICAL NEGATIVE: 0
RESPIRATORY NEGATIVE: 0
CARDIOVASCULAR NEGATIVE: 0

## 2025-02-03 ASSESSMENT — GONIOSCOPY
OD_INFERIOR: D40R 1+ PTM
OS_INFERIOR: D40R 1+ PTM
OD_SUPERIOR: D40R 1+ PTM
OD_TEMPORAL: D40R 1+ PTM
OS_NASAL: D40R 1+ PTM
OD_NASAL: D40R 1+ PTM
OS_TEMPORAL: D40R 1+ PTM
OS_SUPERIOR: D40R 1+ PTM

## 2025-02-03 ASSESSMENT — PACHYMETRY
OD_CT(UM): 558
OS_CT(UM): 561
EXAM_DATE: 2/3/2025

## 2025-02-03 ASSESSMENT — REFRACTION_WEARINGRX
OS_SPHERE: -2.00
OD_CYLINDER: -2.50
OD_ADD: +3.00
OS_CYLINDER: -0.75
OS_ADD: +3.00
OD_AXIS: 094
OS_AXIS: 097
OD_SPHERE: -1.50

## 2025-02-03 ASSESSMENT — CONF VISUAL FIELD
OD_SUPERIOR_TEMPORAL_RESTRICTION: 0
OS_INFERIOR_NASAL_RESTRICTION: 0
OD_INFERIOR_NASAL_RESTRICTION: 0
OD_INFERIOR_TEMPORAL_RESTRICTION: 0
OS_NORMAL: 1
OS_SUPERIOR_TEMPORAL_RESTRICTION: 0
OD_NORMAL: 1
OS_INFERIOR_TEMPORAL_RESTRICTION: 0
METHOD: COUNTING FINGERS
OD_SUPERIOR_NASAL_RESTRICTION: 0
OS_SUPERIOR_NASAL_RESTRICTION: 0

## 2025-02-03 ASSESSMENT — EXTERNAL EXAM - RIGHT EYE: OD_EXAM: NORMAL

## 2025-02-03 ASSESSMENT — VISUAL ACUITY
OD_PH_CC: 20/30-2
METHOD: SNELLEN - LINEAR
CORRECTION_TYPE: GLASSES
OD_CC: 20/50-1
OS_PH_CC: 20/20-2
OS_CC: 20/30-2

## 2025-02-03 ASSESSMENT — TONOMETRY
OD_IOP_MMHG: 24
OS_IOP_MMHG: 21
IOP_METHOD: GOLDMANN APPLANATION

## 2025-02-03 ASSESSMENT — EXTERNAL EXAM - LEFT EYE: OS_EXAM: NORMAL

## 2025-02-03 ASSESSMENT — SLIT LAMP EXAM - LIDS
COMMENTS: NORMAL
COMMENTS: NORMAL

## 2025-02-03 NOTE — PROGRESS NOTES
Visual Acuity (Snellen - Linear)         Right Left    Dist cc 20/50-1 20/30-2    Dist ph cc 20/30-2 20/20-2      Correction: Glasses          Tonometry       Tonometry (Goldmann Applanation, 8:39 AM)         Right Left    Pressure 24 21                  Assessment/Plan   Last dilated:  2/3/25  Last gonio 2/3/25 OD:  D40r 1+ PTM    OS:  D40r 1+ PTM    1. Primary Open-Angle Glaucoma OU:  /561 Tm 35/28.  Pt with a long history of eye drop intolerance timolol -> dizziness/ED, brimonidine -> lethargy, lack of effect, dorzolamide -> GI issues, rhopressa -> paradoxical IOP elevation.  S/p SLT 2012 OU; and repeat 1/24/22 OD and 4/12/22 OS.  IOP is not controlled.  Extensive discussion regarding options 1) CPM, 2) 3rd SLT, 3) incisional surgery - cataract + MIGS.  Briefly reviewed different MIGS options.  R/B/A reviewed.  Discussed diminishing probability of efficacy and duration of effect with each attempt     Plan:  cont latanoprost OU QHS               Pt would like to proceed with 3rd SLT OD (RIGHT)    2. Cataracts OU:  visually and functionally significant      Plan:  monitor

## 2025-03-03 ENCOUNTER — APPOINTMENT (OUTPATIENT)
Dept: OPHTHALMOLOGY | Facility: CLINIC | Age: 80
End: 2025-03-03
Payer: MEDICARE

## 2025-03-03 DIAGNOSIS — H40.1131 PRIMARY OPEN ANGLE GLAUCOMA OF BOTH EYES, MILD STAGE: ICD-10-CM

## 2025-03-03 DIAGNOSIS — H40.1111 PRIMARY OPEN ANGLE GLAUCOMA OF RIGHT EYE, MILD STAGE: Primary | ICD-10-CM

## 2025-03-03 PROCEDURE — 65855 TRABECULOPLASTY LASER SURG: CPT | Mod: RIGHT SIDE | Performed by: OPHTHALMOLOGY

## 2025-03-03 RX ORDER — PREDNISOLONE ACETATE 10 MG/ML
1 SUSPENSION/ DROPS OPHTHALMIC 4 TIMES DAILY
Qty: 5 ML | Refills: 0 | Status: SHIPPED | OUTPATIENT
Start: 2025-03-03 | End: 2025-03-07

## 2025-03-04 NOTE — PROGRESS NOTES
Not recorded       Assessment/Plan     Visual Acuity (Snellen - Linear)         Right Left    Dist cc 20/50-1 20/30-2    Dist ph cc 20/30-2 20/20-2      Correction: Glasses          Tonometry       Tonometry (Goldmann Applanation, 8:39 AM)         Right Left    Pressure 24 21                  Assessment/Plan   Last dilated:  2/3/25  Last gonio 2/3/25 OD:  D40r 1+ PTM    OS:  D40r 1+ PTM    1. Primary Open-Angle Glaucoma OU:  /561 Tm 35/28.  Pt with a long history of eye drop intolerance timolol -> dizziness/ED, brimonidine -> lethargy, lack of effect, dorzolamide -> GI issues, rhopressa -> paradoxical IOP elevation.  S/p SLT 2012 OU; and repeat 1/24/22 OD and 4/12/22 OS.  IOP is not controlled.  Extensive discussion regarding options 1) CPM, 2) 3rd SLT, 3) incisional surgery - cataract + MIGS.  Briefly reviewed different MIGS options.  R/B/A reviewed.  Prev discussed diminishing probability of efficacy and duration of effect with each attempt     Plan:  cont latanoprost OU QHS               Pt would like to proceed with 3rd SLT OD (RIGHT) today 3/3/25, pre-laser IOP = 24 mmHg    2. Cataracts OU:  visually and functionally significant      Plan:  monitor

## 2025-03-20 ENCOUNTER — APPOINTMENT (OUTPATIENT)
Dept: OPHTHALMOLOGY | Facility: CLINIC | Age: 80
End: 2025-03-20
Payer: MEDICARE

## 2025-03-20 DIAGNOSIS — H40.1131 PRIMARY OPEN ANGLE GLAUCOMA OF BOTH EYES, MILD STAGE: Primary | ICD-10-CM

## 2025-03-20 DIAGNOSIS — H25.813 COMBINED FORMS OF AGE-RELATED CATARACT OF BOTH EYES: ICD-10-CM

## 2025-03-20 PROCEDURE — G2211 COMPLEX E/M VISIT ADD ON: HCPCS | Performed by: OPHTHALMOLOGY

## 2025-03-20 PROCEDURE — 99213 OFFICE O/P EST LOW 20 MIN: CPT | Performed by: OPHTHALMOLOGY

## 2025-03-20 RX ORDER — TIMOLOL MALEATE 2.5 MG/ML
1 SOLUTION/ DROPS OPHTHALMIC DAILY
Qty: 5 ML | Refills: 11 | Status: SHIPPED | OUTPATIENT
Start: 2025-03-20 | End: 2026-03-20

## 2025-03-20 ASSESSMENT — CONF VISUAL FIELD
OS_SUPERIOR_TEMPORAL_RESTRICTION: 0
OS_INFERIOR_NASAL_RESTRICTION: 0
OS_NORMAL: 1
OD_NORMAL: 1
OD_SUPERIOR_TEMPORAL_RESTRICTION: 0
OD_SUPERIOR_NASAL_RESTRICTION: 0
METHOD: COUNTING FINGERS
OD_INFERIOR_TEMPORAL_RESTRICTION: 0
OD_INFERIOR_NASAL_RESTRICTION: 0
OS_INFERIOR_TEMPORAL_RESTRICTION: 0
OS_SUPERIOR_NASAL_RESTRICTION: 0

## 2025-03-20 ASSESSMENT — SLIT LAMP EXAM - LIDS
COMMENTS: NORMAL
COMMENTS: NORMAL

## 2025-03-20 ASSESSMENT — ENCOUNTER SYMPTOMS
MUSCULOSKELETAL NEGATIVE: 0
CARDIOVASCULAR NEGATIVE: 0
ENDOCRINE NEGATIVE: 0
HEMATOLOGIC/LYMPHATIC NEGATIVE: 0
PSYCHIATRIC NEGATIVE: 0
GASTROINTESTINAL NEGATIVE: 0
RESPIRATORY NEGATIVE: 0
NEUROLOGICAL NEGATIVE: 0
CONSTITUTIONAL NEGATIVE: 0
EYES NEGATIVE: 1
ALLERGIC/IMMUNOLOGIC NEGATIVE: 0

## 2025-03-20 ASSESSMENT — EXTERNAL EXAM - RIGHT EYE: OD_EXAM: NORMAL

## 2025-03-20 ASSESSMENT — VISUAL ACUITY
METHOD: SNELLEN - LINEAR
OS_CC: 20/25
OD_CC+: -2
OD_CC: 20/25
OS_CC+: -2
CORRECTION_TYPE: GLASSES

## 2025-03-20 ASSESSMENT — PACHYMETRY
OS_CT(UM): 561
OD_CT(UM): 558
EXAM_DATE: 2/3/2025

## 2025-03-20 ASSESSMENT — TONOMETRY
OS_IOP_MMHG: 18
IOP_METHOD: GOLDMANN APPLANATION
OD_IOP_MMHG: 23

## 2025-03-20 ASSESSMENT — EXTERNAL EXAM - LEFT EYE: OS_EXAM: NORMAL

## 2025-03-20 NOTE — PROGRESS NOTES
Visual Acuity (Snellen - Linear)         Right Left    Dist cc 20/25 -2 20/25 -2      Correction: Glasses          Tonometry       Tonometry (Goldmann Applanation, 9:30 AM)         Right Left    Pressure 23 18                  Assessment/Plan   Last dilated:  2/3/25  Last gonio 2/3/25 OD:  D40r 1+ PTM    OS:  D40r 1+ PTM    1. Primary Open-Angle Glaucoma OU:  /561 Tm 35/28.  Pt with a long history of eye drop intolerance timolol -> dizziness/ED, brimonidine -> lethargy, lack of effect, dorzolamide -> GI issues, rhopressa -> paradoxical IOP elevation.  S/p SLT 2012 OU; and repeat 1/24/22 OD and 4/12/22 OS.  IOP is not controlled.  Had reviewed diminishing probability of efficacy and duration of effect with each attempt.  S/p 3rd SLT OD (RIGHT) 3/3/25, pre-laser IOP = 24 mmHg -> 1 mmHg early effect.  However, pt indicates that since the SLT he has been using latanoprost OU every other day and alternating with timolol 0.25% OU every other day.  Reviewed need to use medications every day and he feels like he can tolerate timolol 0.25%     Plan:  increase latanoprost OU QOD  -> QHS               Increase timolol 0.25% OU QOD -> Qam               F/u 1 month    2. Cataracts OU:  visually and functionally significant      Plan:  monitor

## 2025-03-27 ENCOUNTER — APPOINTMENT (OUTPATIENT)
Dept: OPHTHALMOLOGY | Facility: CLINIC | Age: 80
End: 2025-03-27
Payer: MEDICARE

## 2025-04-03 ENCOUNTER — APPOINTMENT (OUTPATIENT)
Dept: OTOLARYNGOLOGY | Facility: CLINIC | Age: 80
End: 2025-04-03
Payer: MEDICARE

## 2025-04-03 VITALS — BODY MASS INDEX: 27.89 KG/M2 | HEIGHT: 68 IN | WEIGHT: 184 LBS

## 2025-04-03 DIAGNOSIS — H61.23 BILATERAL IMPACTED CERUMEN: Primary | ICD-10-CM

## 2025-04-03 DIAGNOSIS — H90.3 SENSORINEURAL HEARING LOSS (SNHL) OF BOTH EARS: ICD-10-CM

## 2025-04-03 PROCEDURE — 1160F RVW MEDS BY RX/DR IN RCRD: CPT | Performed by: NURSE PRACTITIONER

## 2025-04-03 PROCEDURE — 1159F MED LIST DOCD IN RCRD: CPT | Performed by: NURSE PRACTITIONER

## 2025-04-03 PROCEDURE — 69210 REMOVE IMPACTED EAR WAX UNI: CPT | Performed by: NURSE PRACTITIONER

## 2025-04-03 PROCEDURE — 99212 OFFICE O/P EST SF 10 MIN: CPT | Performed by: NURSE PRACTITIONER

## 2025-04-03 PROCEDURE — 1036F TOBACCO NON-USER: CPT | Performed by: NURSE PRACTITIONER

## 2025-04-03 PROCEDURE — 1123F ACP DISCUSS/DSCN MKR DOCD: CPT | Performed by: NURSE PRACTITIONER

## 2025-04-03 ASSESSMENT — PATIENT HEALTH QUESTIONNAIRE - PHQ9
SUM OF ALL RESPONSES TO PHQ9 QUESTIONS 1 AND 2: 0
2. FEELING DOWN, DEPRESSED OR HOPELESS: NOT AT ALL
1. LITTLE INTEREST OR PLEASURE IN DOING THINGS: NOT AT ALL

## 2025-04-03 NOTE — PROGRESS NOTES
Subjective   Patient ID: Silver Villalpando is a 80 y.o. male who presents for Follow-up (Ear cleaning ).  HPI  Patient with a history of high-frequency bilateral sensorineural hearing loss presents here today for routine ear cleaning.  Patient denies having any ear pain or ear drainage or significant changes in hearing.  No complaints of tinnitus, dizziness or vertigo.  Review of Systems  All systems reviewed and are negative except mentioned in HPI.  Objective   Physical Exam  CONSTITUTIONAL: No acute distress, normal facial features; No fever; no chills  VOICE: No hoarseness or other audible abnormality  RESPIRATION: Breathing comfortably, no stridor; normal breathing effort  CV: No cyanosis visible on the face and neck area  EYES:Pupils equal and round ; no erythema; conjunctiva clear; sclera white  NEURO: Alert and oriented, able to raise eyebrows symmetrical bilateral, smile with no facial droop, able to swallow  HEAD AND FACE: Symmetric facial features, no masses or lesions    Right ear examination: External ear normal.  EAC with impacted cerumen.  TM not visualized.  Left ear examination: External ear normal.  EAC with moderate amount of cerumen.  TM is intact with no effusion, retraction or perforation.    NOSE: External nose midline  ORAL CAVITY: No lesions of external lips  NECK/LYMPH: No obvious deformity or lesions; trachea is midline  PSYCH: Alert and oriented with appropriate mood and affect .    Patient ID: Silver Villalpando is a 80 y.o. male.    Procedures    Cerumen removal    Consent:  The planned procedure is discussed including possible risk, benefits and alternative treatments reviewed.  Verbal consent is obtained.    Indications:Obstructed cerumen is noted affecting hearing and causing discomfort.    Procedure: The ears are examined microscopically.  Using speculum, alligator, #7, #5 suction the obstructive cerumen in both the ears were removed.    Findings: Cerumen and epithelial debris obstruction  in both external auditory canals.  Inspection of tympanic membrane after cleaning showed intact with no effusion, retraction or perforation.    Post procedure: The patient tolerated the procedure well without complications     Assessment/Plan       1. Bilateral impacted cerumen        2. Sensorineural hearing loss (SNHL) of both ears          Bilateral ears were cleaned using microscope and instruments.  Patient tolerated procedure well.  He is hearing test is overdue.  Last hearing test was in 2022.  I ordered to repeat hearing test and he will follow-up with me in 6 months with a hearing test.         CYDNEY Carmona-CNP 04/03/25 11:10 AM

## 2025-04-11 DIAGNOSIS — H40.1131 PRIMARY OPEN ANGLE GLAUCOMA OF BOTH EYES, MILD STAGE: Primary | ICD-10-CM

## 2025-04-11 RX ORDER — LATANOPROST 50 UG/ML
1 SOLUTION/ DROPS OPHTHALMIC NIGHTLY
Qty: 7.5 ML | Refills: 3 | Status: SHIPPED | OUTPATIENT
Start: 2025-04-11

## 2025-04-17 ENCOUNTER — APPOINTMENT (OUTPATIENT)
Dept: OPHTHALMOLOGY | Facility: CLINIC | Age: 80
End: 2025-04-17
Payer: MEDICARE

## 2025-04-17 DIAGNOSIS — H25.813 COMBINED FORMS OF AGE-RELATED CATARACT OF BOTH EYES: ICD-10-CM

## 2025-04-17 DIAGNOSIS — H40.1131 PRIMARY OPEN ANGLE GLAUCOMA OF BOTH EYES, MILD STAGE: Primary | ICD-10-CM

## 2025-04-17 PROCEDURE — 99213 OFFICE O/P EST LOW 20 MIN: CPT | Performed by: OPHTHALMOLOGY

## 2025-04-17 ASSESSMENT — CONF VISUAL FIELD
OD_INFERIOR_TEMPORAL_RESTRICTION: 0
OS_SUPERIOR_NASAL_RESTRICTION: 0
OD_SUPERIOR_NASAL_RESTRICTION: 0
OS_INFERIOR_TEMPORAL_RESTRICTION: 0
OD_SUPERIOR_TEMPORAL_RESTRICTION: 0
OS_NORMAL: 1
OS_SUPERIOR_TEMPORAL_RESTRICTION: 0
OD_INFERIOR_NASAL_RESTRICTION: 0
OS_INFERIOR_NASAL_RESTRICTION: 0
OD_NORMAL: 1

## 2025-04-17 ASSESSMENT — PACHYMETRY
EXAM_DATE: 2/3/2025
OS_CT(UM): 561
OD_CT(UM): 558

## 2025-04-17 ASSESSMENT — ENCOUNTER SYMPTOMS
NEUROLOGICAL NEGATIVE: 0
CARDIOVASCULAR NEGATIVE: 0
EYES NEGATIVE: 1
RESPIRATORY NEGATIVE: 0
CONSTITUTIONAL NEGATIVE: 0
MUSCULOSKELETAL NEGATIVE: 0
PSYCHIATRIC NEGATIVE: 0
HEMATOLOGIC/LYMPHATIC NEGATIVE: 0
ENDOCRINE NEGATIVE: 0
GASTROINTESTINAL NEGATIVE: 0
ALLERGIC/IMMUNOLOGIC NEGATIVE: 0

## 2025-04-17 ASSESSMENT — EXTERNAL EXAM - LEFT EYE: OS_EXAM: NORMAL

## 2025-04-17 ASSESSMENT — CUP TO DISC RATIO
OS_RATIO: 0.75
OD_RATIO: 0.75

## 2025-04-17 ASSESSMENT — VISUAL ACUITY
OD_CC: 20/40
OS_CC: 20/20
METHOD: SNELLEN - LINEAR
OD_CC+: -2
OS_CC+: -2

## 2025-04-17 ASSESSMENT — SLIT LAMP EXAM - LIDS
COMMENTS: NORMAL
COMMENTS: NORMAL

## 2025-04-17 ASSESSMENT — TONOMETRY
OS_IOP_MMHG: 18
OD_IOP_MMHG: 18
IOP_METHOD: GOLDMANN APPLANATION

## 2025-04-17 ASSESSMENT — EXTERNAL EXAM - RIGHT EYE: OD_EXAM: NORMAL

## 2025-04-17 NOTE — PROGRESS NOTES
Visual Acuity (Snellen - Linear)         Right Left    Dist cc 20/40 -2 20/20 -2          Tonometry       Tonometry (Goldmann Applanation, 9:59 AM)         Right Left    Pressure 18 18                  Assessment/Plan   Last dilated:  2/3/25  Last gonio 2/3/25 OD:  D40r 1+ PTM    OS:  D40r 1+ PTM    1. Primary Open-Angle Glaucoma OU:  /561 Tm 35/28.  Pt with a long history of eye drop intolerance timolol -> dizziness/ED, brimonidine -> lethargy, lack of effect, dorzolamide -> GI issues, rhopressa -> paradoxical IOP elevation.  S/p SLT 2012 OU; and repeat 1/24/22 OD and 4/12/22 OS.  IOP is not controlled.  Had reviewed diminishing probability of efficacy and duration of effect with each attempt.  S/p 3rd SLT OD (RIGHT) 3/3/25, pre-laser IOP = 24 mmHg -> 1 mmHg early effect.  However, pt indicates that since the SLT he has been using latanoprost OU every other day and alternating with timolol 0.25% OU every other day.  Reviewed need to use medications every day and he feels like he can tolerate timolol 0.25%.  With using drops properly, IOP is now well controlled     Plan:  cont latanoprost OU QHS               cont timolol 0.25% OU Qam               F/u 3 months    2. Cataracts OU:  visually and functionally significant      Plan:  monitor

## 2025-06-13 ENCOUNTER — APPOINTMENT (OUTPATIENT)
Dept: PRIMARY CARE | Facility: CLINIC | Age: 80
End: 2025-06-13
Payer: MEDICARE

## 2025-07-29 NOTE — PROGRESS NOTES
7/31/2025 CC: 80 y.o. presents for glaucoma FU, Dr Mujica    Past ocular history: POAG, NS cataract  Family history: As per chart   Past medical history: As per chart   Social history: denies tobacco     Eye medications:   Both eyes: Latanoprost qhs and Timolol 0.25% qam     Allergy:  Amoxicillin, others per chart     Testing:   none    Assessment:   Last dilated:  2/3/25  Last gonio 2/3/25 OD:  D40r 1+ PTM    OS:  D40r 1+ PTM     1. Primary Open-Angle Glaucoma OU:  /561 Tm 35/28.  Pt with a long history of eye drop intolerance timolol -> dizziness/ED, brimonidine -> lethargy, lack of effect, dorzolamide -> GI issues, rhopressa -> paradoxical IOP elevation.  S/p SLT 2012 OU; and repeat 1/24/22 OD and 4/12/22 OS.  IOP is not controlled.  Had reviewed diminishing probability of efficacy and duration of effect with each attempt.  S/p 3rd SLT OD (RIGHT) 3/3/25, pre-laser IOP = 24 mmHg -> 1 mmHg early effect.  However, pt indicates that since the SLT he has been using latanoprost OU every other day and alternating with timolol 0.25% OU every other day.  Reviewed need to use medications every day and he feels like he can tolerate timolol 0.25%.  With using drops properly, IOP is now well controlled    -7/31/2025: IOP 18/17.  Compliant with drops, latanoprost OU  and timolol 0.25% OU every day. Possible cataract progression OD>OS.       Plan:  cont latanoprost OU QHS               cont timolol 0.25% OU Qam               F/u 2-3 months     2. Cataracts OU:  visually and functionally significant      Plan:  monitor

## 2025-07-31 ENCOUNTER — OFFICE VISIT (OUTPATIENT)
Dept: OPHTHALMOLOGY | Facility: CLINIC | Age: 80
End: 2025-07-31
Payer: MEDICARE

## 2025-07-31 ENCOUNTER — APPOINTMENT (OUTPATIENT)
Dept: OPHTHALMOLOGY | Facility: CLINIC | Age: 80
End: 2025-07-31
Payer: MEDICARE

## 2025-07-31 DIAGNOSIS — H25.813 COMBINED FORMS OF AGE-RELATED CATARACT OF BOTH EYES: ICD-10-CM

## 2025-07-31 DIAGNOSIS — H40.1131 PRIMARY OPEN ANGLE GLAUCOMA OF BOTH EYES, MILD STAGE: Primary | ICD-10-CM

## 2025-07-31 PROCEDURE — 99213 OFFICE O/P EST LOW 20 MIN: CPT | Performed by: OPHTHALMOLOGY

## 2025-07-31 ASSESSMENT — ENCOUNTER SYMPTOMS
PSYCHIATRIC NEGATIVE: 0
ENDOCRINE NEGATIVE: 0
NEUROLOGICAL NEGATIVE: 0
EYES NEGATIVE: 1
GASTROINTESTINAL NEGATIVE: 0
MUSCULOSKELETAL NEGATIVE: 0
CARDIOVASCULAR NEGATIVE: 0
HEMATOLOGIC/LYMPHATIC NEGATIVE: 0
RESPIRATORY NEGATIVE: 0
CONSTITUTIONAL NEGATIVE: 0
ALLERGIC/IMMUNOLOGIC NEGATIVE: 0

## 2025-07-31 ASSESSMENT — PACHYMETRY
OD_CT(UM): 558
EXAM_DATE: 2/3/2025
OS_CT(UM): 561

## 2025-07-31 ASSESSMENT — EXTERNAL EXAM - RIGHT EYE: OD_EXAM: NORMAL

## 2025-07-31 ASSESSMENT — CUP TO DISC RATIO
OS_RATIO: 0.75
OD_RATIO: 0.75

## 2025-07-31 ASSESSMENT — VISUAL ACUITY
OS_CC+: -2
OD_CC: 20/30
OS_CC: 20/20
OD_CC+: -2
METHOD: SNELLEN - LINEAR

## 2025-07-31 ASSESSMENT — EXTERNAL EXAM - LEFT EYE: OS_EXAM: NORMAL

## 2025-07-31 ASSESSMENT — SLIT LAMP EXAM - LIDS
COMMENTS: NORMAL
COMMENTS: NORMAL

## 2025-07-31 ASSESSMENT — TONOMETRY
OS_IOP_MMHG: 17
OD_IOP_MMHG: 18
IOP_METHOD: GOLDMANN APPLANATION

## 2025-09-03 LAB
ALBUMIN SERPL-MCNC: 4.1 G/DL (ref 3.6–5.1)
ALP SERPL-CCNC: 56 U/L (ref 35–144)
ALT SERPL-CCNC: 19 U/L (ref 9–46)
ANION GAP SERPL CALCULATED.4IONS-SCNC: 7 MMOL/L (CALC) (ref 7–17)
AST SERPL-CCNC: 15 U/L (ref 10–35)
BILIRUB SERPL-MCNC: 0.6 MG/DL (ref 0.2–1.2)
BUN SERPL-MCNC: 16 MG/DL (ref 7–25)
CALCIUM SERPL-MCNC: 9.3 MG/DL (ref 8.6–10.3)
CHLORIDE SERPL-SCNC: 105 MMOL/L (ref 98–110)
CHOLEST SERPL-MCNC: 206 MG/DL
CHOLEST/HDLC SERPL: 2.9 (CALC)
CO2 SERPL-SCNC: 26 MMOL/L (ref 20–32)
CREAT SERPL-MCNC: 0.73 MG/DL (ref 0.7–1.28)
EGFRCR SERPLBLD CKD-EPI 2021: 93 ML/MIN/1.73M2
GLUCOSE SERPL-MCNC: 114 MG/DL (ref 65–99)
HDLC SERPL-MCNC: 71 MG/DL
LDLC SERPL CALC-MCNC: 115 MG/DL (CALC)
NONHDLC SERPL-MCNC: 135 MG/DL (CALC)
POTASSIUM SERPL-SCNC: 4.3 MMOL/L (ref 3.5–5.3)
PROT SERPL-MCNC: 6.4 G/DL (ref 6.1–8.1)
SODIUM SERPL-SCNC: 138 MMOL/L (ref 135–146)
TRIGL SERPL-MCNC: 98 MG/DL

## 2025-09-04 ENCOUNTER — APPOINTMENT (OUTPATIENT)
Dept: PRIMARY CARE | Facility: CLINIC | Age: 80
End: 2025-09-04
Payer: MEDICARE

## 2025-09-05 ENCOUNTER — ANCILLARY PROCEDURE (OUTPATIENT)
Dept: CARDIOLOGY | Facility: CLINIC | Age: 80
End: 2025-09-05
Payer: MEDICARE

## 2025-09-05 DIAGNOSIS — R01.1 CARDIAC MURMUR: ICD-10-CM

## 2025-09-05 LAB
AORTIC VALVE MEAN GRADIENT: 8 MMHG
AORTIC VALVE PEAK VELOCITY: 1.92 M/S
AV PEAK GRADIENT: 15 MMHG
AVA (PEAK VEL): 2.17 CM2
AVA (VTI): 2.19 CM2
EJECTION FRACTION APICAL 4 CHAMBER: 63.6
EJECTION FRACTION: 64 %
LEFT ATRIUM VOLUME AREA LENGTH INDEX BSA: 41.7 ML/M2
LEFT VENTRICLE INTERNAL DIMENSION DIASTOLE: 4.41 CM (ref 3.5–6)
LEFT VENTRICULAR OUTFLOW TRACT DIAMETER: 1.98 CM
MITRAL VALVE E/A RATIO: 0.91
RIGHT VENTRICLE FREE WALL PEAK S': 21 CM/S
RIGHT VENTRICLE PEAK SYSTOLIC PRESSURE: 19 MMHG
TRICUSPID ANNULAR PLANE SYSTOLIC EXCURSION: 2.2 CM

## 2025-09-05 PROCEDURE — 93306 TTE W/DOPPLER COMPLETE: CPT

## 2025-09-05 PROCEDURE — 93306 TTE W/DOPPLER COMPLETE: CPT | Performed by: INTERNAL MEDICINE

## 2025-10-01 ENCOUNTER — APPOINTMENT (OUTPATIENT)
Dept: OPHTHALMOLOGY | Facility: CLINIC | Age: 80
End: 2025-10-01
Payer: MEDICARE

## 2025-10-09 ENCOUNTER — APPOINTMENT (OUTPATIENT)
Dept: OTOLARYNGOLOGY | Facility: CLINIC | Age: 80
End: 2025-10-09
Payer: MEDICARE

## 2025-12-18 ENCOUNTER — APPOINTMENT (OUTPATIENT)
Dept: PRIMARY CARE | Facility: CLINIC | Age: 80
End: 2025-12-18
Payer: MEDICARE

## (undated) DEVICE — CUFF, TOURNIQUET 18 DUAL PORT/SNGL BLAD"

## (undated) DEVICE — BANDAGE, ELASTIC, ACE, SELF-CLOSURE, 3 IN X 5 YD, NONSTERILE

## (undated) DEVICE — GLOVE, SURGICAL, PROTEXIS PI BLUE W/NEUTHERA, 8.0, PF, LF

## (undated) DEVICE — PADDING, WEBRIL, UNDERCAST, STERILE, 3 IN

## (undated) DEVICE — TISSUE ADHESIVE, EXOFIN, 1ML

## (undated) DEVICE — APPLICATOR, CHLORAPREP, W/ORANGE TINT, 26ML

## (undated) DEVICE — Device

## (undated) DEVICE — GLOVE, SURGICAL, PROTEXIS PI , 7.5, PF, LF